# Patient Record
Sex: MALE | Race: WHITE | Employment: FULL TIME | URBAN - METROPOLITAN AREA
[De-identification: names, ages, dates, MRNs, and addresses within clinical notes are randomized per-mention and may not be internally consistent; named-entity substitution may affect disease eponyms.]

---

## 2017-02-01 ENCOUNTER — LAB ENCOUNTER (OUTPATIENT)
Dept: LAB | Age: 32
End: 2017-02-01
Payer: COMMERCIAL

## 2017-02-01 DIAGNOSIS — E03.9 HYPOTHYROIDISM: Primary | ICD-10-CM

## 2017-02-01 LAB — TSI SER-ACNC: 1.77 MIU/ML (ref 0.35–5.5)

## 2017-02-01 PROCEDURE — 36415 COLL VENOUS BLD VENIPUNCTURE: CPT

## 2017-02-01 PROCEDURE — 84443 ASSAY THYROID STIM HORMONE: CPT

## 2018-03-05 ENCOUNTER — TELEPHONE (OUTPATIENT)
Dept: INTERNAL MEDICINE CLINIC | Facility: CLINIC | Age: 33
End: 2018-03-05

## 2018-03-05 ENCOUNTER — OFFICE VISIT (OUTPATIENT)
Dept: INTERNAL MEDICINE CLINIC | Facility: CLINIC | Age: 33
End: 2018-03-05

## 2018-03-05 VITALS
RESPIRATION RATE: 14 BRPM | DIASTOLIC BLOOD PRESSURE: 80 MMHG | BODY MASS INDEX: 26.53 KG/M2 | WEIGHT: 169 LBS | HEART RATE: 84 BPM | SYSTOLIC BLOOD PRESSURE: 122 MMHG | TEMPERATURE: 98 F | HEIGHT: 67 IN

## 2018-03-05 DIAGNOSIS — Z77.011 LEAD EXPOSURE: ICD-10-CM

## 2018-03-05 DIAGNOSIS — E03.9 HYPOTHYROIDISM, UNSPECIFIED TYPE: Primary | ICD-10-CM

## 2018-03-05 DIAGNOSIS — Z00.00 LABORATORY EXAM ORDERED AS PART OF ROUTINE GENERAL MEDICAL EXAMINATION: ICD-10-CM

## 2018-03-05 PROCEDURE — 99203 OFFICE O/P NEW LOW 30 MIN: CPT | Performed by: PHYSICIAN ASSISTANT

## 2018-03-05 RX ORDER — LEVOTHYROXINE SODIUM 0.12 MG/1
125 TABLET ORAL
COMMUNITY
End: 2018-03-05

## 2018-03-05 NOTE — PATIENT INSTRUCTIONS
Please do your lab work in 6-8 weeks (middle to end of April). Remember to fast for 10-12 hours but drink plenty of water.

## 2018-03-05 NOTE — PROGRESS NOTES
Ernie Lowe is a 28year old male. HPI:   Patient presents for to establish care. Dx with hypothyroidism in childhood (age 16-14) and has been on levothyroxine since that time. Dose stable the last few years. Ran out of medication 2-3 weeks ago.   Gene rate and effort, clear to auscultation bilaterally  CARDIO: RRR, normal S1 & S2, no murmur  GI: soft, non-tender, non-distended, no guarding or rebound tenderness, no hepatosplenomegaly  EXTREMITIES: no cyanosis, clubbing, or edema  NEURO: A&O x3, appropri

## 2018-03-06 RX ORDER — LEVOTHYROXINE SODIUM 0.12 MG/1
125 TABLET ORAL
Qty: 90 TABLET | Refills: 0
Start: 2018-03-06

## 2018-06-04 ENCOUNTER — APPOINTMENT (OUTPATIENT)
Dept: LAB | Age: 33
End: 2018-06-04
Attending: PHYSICIAN ASSISTANT
Payer: COMMERCIAL

## 2018-06-04 DIAGNOSIS — Z00.00 LABORATORY EXAM ORDERED AS PART OF ROUTINE GENERAL MEDICAL EXAMINATION: ICD-10-CM

## 2018-06-04 DIAGNOSIS — E03.9 HYPOTHYROIDISM, UNSPECIFIED TYPE: ICD-10-CM

## 2018-06-04 DIAGNOSIS — Z77.011 LEAD EXPOSURE: ICD-10-CM

## 2018-06-04 PROCEDURE — 84450 TRANSFERASE (AST) (SGOT): CPT | Performed by: PHYSICIAN ASSISTANT

## 2018-06-04 PROCEDURE — 84443 ASSAY THYROID STIM HORMONE: CPT | Performed by: PHYSICIAN ASSISTANT

## 2018-06-04 PROCEDURE — 80048 BASIC METABOLIC PNL TOTAL CA: CPT | Performed by: PHYSICIAN ASSISTANT

## 2018-06-04 PROCEDURE — 36415 COLL VENOUS BLD VENIPUNCTURE: CPT | Performed by: PHYSICIAN ASSISTANT

## 2018-06-04 PROCEDURE — 83036 HEMOGLOBIN GLYCOSYLATED A1C: CPT | Performed by: PHYSICIAN ASSISTANT

## 2018-06-04 PROCEDURE — 84460 ALANINE AMINO (ALT) (SGPT): CPT | Performed by: PHYSICIAN ASSISTANT

## 2018-06-04 PROCEDURE — 83655 ASSAY OF LEAD: CPT | Performed by: PHYSICIAN ASSISTANT

## 2018-06-04 PROCEDURE — 80061 LIPID PANEL: CPT | Performed by: PHYSICIAN ASSISTANT

## 2018-06-08 RX ORDER — LEVOTHYROXINE SODIUM 0.12 MG/1
125 TABLET ORAL
Qty: 90 TABLET | Refills: 1 | Status: SHIPPED
Start: 2018-06-08 | End: 2019-02-04

## 2018-06-08 NOTE — TELEPHONE ENCOUNTER
From: Mega Amaro  Sent: 6/8/2018 7:15 AM CDT  Subject: Medication Renewal Request    Mega Amaro would like a refill of the following medications:     Levothyroxine Sodium 125 MCG Oral Tab REBECCA Hercules]    Preferred pharmacy: [facility] [address]  [id number] [facility] [phone], [phone]

## 2018-09-14 RX ORDER — LEVOTHYROXINE SODIUM 0.12 MG/1
125 TABLET ORAL
Qty: 90 TABLET | Refills: 1 | Status: CANCELLED | OUTPATIENT
Start: 2018-09-14

## 2018-09-14 NOTE — TELEPHONE ENCOUNTER
Pt should have a refill on file. Sent [facility]. Refill requested: Levothyroxine 125 mcg     Passed protocol      Last refill: 6/8/18 #90 1R    Relevant Labs: TSH 2/1/17   Last OV / RTC advised: 3/5/18   RTC 6-12 months    Appt Scheduled:  No

## 2019-02-06 RX ORDER — LEVOTHYROXINE SODIUM 0.12 MG/1
TABLET ORAL
Qty: 90 TABLET | Refills: 0 | OUTPATIENT
Start: 2019-02-06

## 2019-02-06 RX ORDER — LEVOTHYROXINE SODIUM 0.12 MG/1
125 TABLET ORAL
Qty: 90 TABLET | Refills: 0 | Status: SHIPPED | OUTPATIENT
Start: 2019-02-06 | End: 2019-03-12

## 2019-02-06 NOTE — TELEPHONE ENCOUNTER
Last OV: 3/5/18 with REBECCA Rdz  Last refill date: 6/8/18     #/refills: #90, 1 refill  When pt was asked to return for OV: 6-12 months  Upcoming appt/reason: no upcoming appt  Last labs 6/4/18

## 2019-02-07 RX ORDER — LEVOTHYROXINE SODIUM 0.12 MG/1
TABLET ORAL
Qty: 90 TABLET | Refills: 1 | OUTPATIENT
Start: 2019-02-07

## 2019-02-07 RX ORDER — LEVOTHYROXINE SODIUM 0.12 MG/1
125 TABLET ORAL
Qty: 90 TABLET | Refills: 0 | OUTPATIENT
Start: 2019-02-07

## 2019-02-07 NOTE — TELEPHONE ENCOUNTER
Last OV: 3/5/18 with REBECCA Rosa  Last refill date: 2/6/19     #/refills: #90, 0 refills    DUPLICATE REQUEST

## 2019-03-11 ENCOUNTER — LAB ENCOUNTER (OUTPATIENT)
Dept: LAB | Age: 34
End: 2019-03-11
Attending: PHYSICIAN ASSISTANT
Payer: COMMERCIAL

## 2019-03-11 ENCOUNTER — OFFICE VISIT (OUTPATIENT)
Dept: INTERNAL MEDICINE CLINIC | Facility: CLINIC | Age: 34
End: 2019-03-11
Payer: COMMERCIAL

## 2019-03-11 VITALS
DIASTOLIC BLOOD PRESSURE: 74 MMHG | HEIGHT: 66.25 IN | BODY MASS INDEX: 28.53 KG/M2 | WEIGHT: 177.5 LBS | OXYGEN SATURATION: 98 % | SYSTOLIC BLOOD PRESSURE: 102 MMHG | HEART RATE: 66 BPM | RESPIRATION RATE: 12 BRPM | TEMPERATURE: 98 F

## 2019-03-11 DIAGNOSIS — Z00.00 ANNUAL PHYSICAL EXAM: Primary | ICD-10-CM

## 2019-03-11 DIAGNOSIS — H91.91 HEARING LOSS OF RIGHT EAR, UNSPECIFIED HEARING LOSS TYPE: ICD-10-CM

## 2019-03-11 DIAGNOSIS — Z00.00 ANNUAL PHYSICAL EXAM: ICD-10-CM

## 2019-03-11 DIAGNOSIS — E03.9 HYPOTHYROIDISM, UNSPECIFIED TYPE: ICD-10-CM

## 2019-03-11 LAB
ANION GAP SERPL CALC-SCNC: 8 MMOL/L (ref 0–18)
BUN BLD-MCNC: 24 MG/DL (ref 7–18)
BUN/CREAT SERPL: 23.3 (ref 10–20)
CALCIUM BLD-MCNC: 9.4 MG/DL (ref 8.5–10.1)
CHLORIDE SERPL-SCNC: 110 MMOL/L (ref 98–107)
CHOLEST SMN-MCNC: 181 MG/DL (ref ?–200)
CO2 SERPL-SCNC: 24 MMOL/L (ref 21–32)
CREAT BLD-MCNC: 1.03 MG/DL (ref 0.7–1.3)
GLUCOSE BLD-MCNC: 66 MG/DL (ref 70–99)
HDLC SERPL-MCNC: 57 MG/DL (ref 40–59)
LDLC SERPL CALC-MCNC: 114 MG/DL (ref ?–100)
NONHDLC SERPL-MCNC: 124 MG/DL (ref ?–130)
OSMOLALITY SERPL CALC.SUM OF ELEC: 296 MOSM/KG (ref 275–295)
POTASSIUM SERPL-SCNC: 3.8 MMOL/L (ref 3.5–5.1)
SODIUM SERPL-SCNC: 142 MMOL/L (ref 136–145)
T4 FREE SERPL-MCNC: 1.2 NG/DL (ref 0.8–1.7)
TRIGL SERPL-MCNC: 49 MG/DL (ref 30–149)
TSI SER-ACNC: 21 MIU/ML (ref 0.36–3.74)
VLDLC SERPL CALC-MCNC: 10 MG/DL (ref 0–30)

## 2019-03-11 PROCEDURE — 80048 BASIC METABOLIC PNL TOTAL CA: CPT | Performed by: PHYSICIAN ASSISTANT

## 2019-03-11 PROCEDURE — 90686 IIV4 VACC NO PRSV 0.5 ML IM: CPT | Performed by: PHYSICIAN ASSISTANT

## 2019-03-11 PROCEDURE — 90471 IMMUNIZATION ADMIN: CPT | Performed by: PHYSICIAN ASSISTANT

## 2019-03-11 PROCEDURE — 84443 ASSAY THYROID STIM HORMONE: CPT | Performed by: PHYSICIAN ASSISTANT

## 2019-03-11 PROCEDURE — 84439 ASSAY OF FREE THYROXINE: CPT | Performed by: PHYSICIAN ASSISTANT

## 2019-03-11 PROCEDURE — 99395 PREV VISIT EST AGE 18-39: CPT | Performed by: PHYSICIAN ASSISTANT

## 2019-03-11 PROCEDURE — 80061 LIPID PANEL: CPT | Performed by: PHYSICIAN ASSISTANT

## 2019-03-11 PROCEDURE — 36415 COLL VENOUS BLD VENIPUNCTURE: CPT | Performed by: PHYSICIAN ASSISTANT

## 2019-03-11 NOTE — PROGRESS NOTES
Praneeth Lockwood is a 35year old male who presents for a complete physical exam.   HPI:   Pt here for wellness visit.     Diet - well balanced, about 50/50 lean & red meat, drinks about 40 ounces of soda daily, 1 coffee  Exercise - 5x/week - charan & anitha medina Left arm, Patient Position: Sitting, Cuff Size: large)   Pulse 66   Temp 97.6 °F (36.4 °C) (Oral)   Resp 12   Ht 66.25\"   Wt 177 lb 8 oz   SpO2 98%   BMI 28.43 kg/m²   GENERAL: A&O, well developed, well nourished, in no apparent distress  SKIN: no rashes,

## 2019-03-12 DIAGNOSIS — E03.9 HYPOTHYROIDISM, UNSPECIFIED TYPE: Primary | ICD-10-CM

## 2019-03-12 RX ORDER — LEVOTHYROXINE SODIUM 0.15 MG/1
150 TABLET ORAL
Qty: 90 TABLET | Refills: 0 | Status: SHIPPED | OUTPATIENT
Start: 2019-03-12 | End: 2019-08-30

## 2019-10-03 ENCOUNTER — APPOINTMENT (OUTPATIENT)
Dept: LAB | Age: 34
End: 2019-10-03
Attending: PHYSICIAN ASSISTANT
Payer: COMMERCIAL

## 2019-10-03 DIAGNOSIS — E03.9 HYPOTHYROIDISM, UNSPECIFIED TYPE: ICD-10-CM

## 2019-10-03 PROCEDURE — 36415 COLL VENOUS BLD VENIPUNCTURE: CPT

## 2019-10-03 PROCEDURE — 84443 ASSAY THYROID STIM HORMONE: CPT

## 2019-10-04 RX ORDER — LEVOTHYROXINE SODIUM 0.15 MG/1
150 TABLET ORAL
Qty: 90 TABLET | Refills: 1 | Status: SHIPPED | OUTPATIENT
Start: 2019-10-04 | End: 2020-04-16

## 2019-10-04 NOTE — TELEPHONE ENCOUNTER
Levothyroxine Sodium 150 MCG Oral Tab    Last OV relevant to medication: 3-     Last refill date: 8- # 30 tabs with 0 refills     When pt was asked to return for OV:  1 year for wellness visit    Upcoming appt/reason: none     Labs: 3-

## 2020-01-10 RX ORDER — LEVOTHYROXINE SODIUM 0.15 MG/1
150 TABLET ORAL
Qty: 90 TABLET | Refills: 1 | OUTPATIENT
Start: 2020-01-10

## 2020-01-10 NOTE — TELEPHONE ENCOUNTER
Last OV: 3/11/19 with Uma Dominguez PA-C  Last refill date: 10/4/19     #/refills: #90, 1 refill  When pt was asked to return for OV: 1 year  Upcoming appt/reason: no upcoming appt  Last labs 10/3/19 (TSH)

## 2020-04-16 ENCOUNTER — VIRTUAL PHONE E/M (OUTPATIENT)
Dept: INTERNAL MEDICINE CLINIC | Facility: CLINIC | Age: 35
End: 2020-04-16
Payer: COMMERCIAL

## 2020-04-16 DIAGNOSIS — Z11.59 NEED FOR HEPATITIS C SCREENING TEST: ICD-10-CM

## 2020-04-16 DIAGNOSIS — E03.9 HYPOTHYROIDISM, UNSPECIFIED TYPE: ICD-10-CM

## 2020-04-16 DIAGNOSIS — Z00.00 LABORATORY EXAM ORDERED AS PART OF ROUTINE GENERAL MEDICAL EXAMINATION: Primary | ICD-10-CM

## 2020-04-16 PROCEDURE — 99213 OFFICE O/P EST LOW 20 MIN: CPT | Performed by: PHYSICIAN ASSISTANT

## 2020-04-16 RX ORDER — LEVOTHYROXINE SODIUM 0.15 MG/1
150 TABLET ORAL
Qty: 90 TABLET | Refills: 0 | Status: SHIPPED | OUTPATIENT
Start: 2020-04-16 | End: 2020-07-23

## 2020-04-16 NOTE — PROGRESS NOTES
Virtual Telephone Check-In    Sherry Petty verbally consents to a Virtual/Telephone Check-In visit on 04/16/20. Patient understands and accepts financial responsibility for any deductible, co-insurance and/or co-pays associated with this service.     Guy Jaramillo breath, CELAYA, wheezing  CARDIOVASCULAR: denies chest pain, SOB, CELAYA, palpitations  GI: denies abdominal pain, nausea, vomiting, diarrhea, constipation, melena, BRBPR, heartburn  : denies dysuria, hematuria, nocuturia  MUSCULOSKELETAL: no arthralgias  NEUR

## 2020-07-21 ENCOUNTER — HOSPITAL ENCOUNTER (OUTPATIENT)
Age: 35
Discharge: STILL ADMITTED OR ADMINISTRATIVE | End: 2020-07-21
Payer: COMMERCIAL

## 2020-07-22 ENCOUNTER — APPOINTMENT (OUTPATIENT)
Dept: LAB | Age: 35
End: 2020-07-22
Attending: PHYSICIAN ASSISTANT
Payer: COMMERCIAL

## 2020-07-22 DIAGNOSIS — Z11.59 NEED FOR HEPATITIS C SCREENING TEST: ICD-10-CM

## 2020-07-22 DIAGNOSIS — E03.9 HYPOTHYROIDISM, UNSPECIFIED TYPE: ICD-10-CM

## 2020-07-22 DIAGNOSIS — Z00.00 LABORATORY EXAM ORDERED AS PART OF ROUTINE GENERAL MEDICAL EXAMINATION: ICD-10-CM

## 2020-07-22 LAB
ALT SERPL-CCNC: 28 U/L (ref 16–61)
ANION GAP SERPL CALC-SCNC: 2 MMOL/L (ref 0–18)
AST SERPL-CCNC: 28 U/L (ref 15–37)
BUN BLD-MCNC: 21 MG/DL (ref 7–18)
BUN/CREAT SERPL: 19.3 (ref 10–20)
CALCIUM BLD-MCNC: 9.7 MG/DL (ref 8.5–10.1)
CHLORIDE SERPL-SCNC: 108 MMOL/L (ref 98–112)
CHOLEST SMN-MCNC: 176 MG/DL (ref ?–200)
CO2 SERPL-SCNC: 28 MMOL/L (ref 21–32)
CREAT BLD-MCNC: 1.09 MG/DL (ref 0.7–1.3)
EST. AVERAGE GLUCOSE BLD GHB EST-MCNC: 103 MG/DL (ref 68–126)
GLUCOSE BLD-MCNC: 78 MG/DL (ref 70–99)
HBA1C MFR BLD HPLC: 5.2 % (ref ?–5.7)
HCV AB SERPL QL IA: NONREACTIVE
HDLC SERPL-MCNC: 58 MG/DL (ref 40–59)
LDLC SERPL CALC-MCNC: 110 MG/DL (ref ?–100)
NONHDLC SERPL-MCNC: 118 MG/DL (ref ?–130)
OSMOLALITY SERPL CALC.SUM OF ELEC: 288 MOSM/KG (ref 275–295)
PATIENT FASTING Y/N/NP: YES
PATIENT FASTING Y/N/NP: YES
POTASSIUM SERPL-SCNC: 4 MMOL/L (ref 3.5–5.1)
SODIUM SERPL-SCNC: 138 MMOL/L (ref 136–145)
TRIGL SERPL-MCNC: 38 MG/DL (ref 30–149)
TSI SER-ACNC: 0.68 MIU/ML (ref 0.36–3.74)
VLDLC SERPL CALC-MCNC: 8 MG/DL (ref 0–30)

## 2020-07-22 PROCEDURE — 80061 LIPID PANEL: CPT

## 2020-07-22 PROCEDURE — 83036 HEMOGLOBIN GLYCOSYLATED A1C: CPT

## 2020-07-22 PROCEDURE — 84460 ALANINE AMINO (ALT) (SGPT): CPT

## 2020-07-22 PROCEDURE — 86803 HEPATITIS C AB TEST: CPT

## 2020-07-22 PROCEDURE — 36415 COLL VENOUS BLD VENIPUNCTURE: CPT

## 2020-07-22 PROCEDURE — 80048 BASIC METABOLIC PNL TOTAL CA: CPT

## 2020-07-22 PROCEDURE — 84450 TRANSFERASE (AST) (SGOT): CPT

## 2020-07-22 PROCEDURE — 84443 ASSAY THYROID STIM HORMONE: CPT

## 2020-07-24 RX ORDER — LEVOTHYROXINE SODIUM 0.15 MG/1
150 TABLET ORAL
Qty: 90 TABLET | Refills: 3 | Status: SHIPPED | OUTPATIENT
Start: 2020-07-24 | End: 2021-08-04

## 2020-07-24 NOTE — TELEPHONE ENCOUNTER
Appointment needed - Due for CPX - [employer] message sent. Requesting Levothyroxine Sodium 150 MCG Oral Tab  LOV: 4/16/20 Virtual visit  RTC: 6-12 months  Last Relevant Labs: 7/22/20  Filled: 4/16/20 #90 with 0 refills    No future appointments.

## 2021-08-04 ENCOUNTER — OFFICE VISIT (OUTPATIENT)
Dept: INTERNAL MEDICINE CLINIC | Facility: CLINIC | Age: 36
End: 2021-08-04
Payer: COMMERCIAL

## 2021-08-04 VITALS
WEIGHT: 186.19 LBS | DIASTOLIC BLOOD PRESSURE: 80 MMHG | RESPIRATION RATE: 16 BRPM | TEMPERATURE: 98 F | BODY MASS INDEX: 29.22 KG/M2 | HEIGHT: 66.75 IN | SYSTOLIC BLOOD PRESSURE: 115 MMHG | HEART RATE: 86 BPM | OXYGEN SATURATION: 98 %

## 2021-08-04 DIAGNOSIS — E03.9 HYPOTHYROIDISM, UNSPECIFIED TYPE: ICD-10-CM

## 2021-08-04 DIAGNOSIS — Z00.00 ANNUAL PHYSICAL EXAM: Primary | ICD-10-CM

## 2021-08-04 PROCEDURE — 3074F SYST BP LT 130 MM HG: CPT | Performed by: PHYSICIAN ASSISTANT

## 2021-08-04 PROCEDURE — 99395 PREV VISIT EST AGE 18-39: CPT | Performed by: PHYSICIAN ASSISTANT

## 2021-08-04 PROCEDURE — 3079F DIAST BP 80-89 MM HG: CPT | Performed by: PHYSICIAN ASSISTANT

## 2021-08-04 PROCEDURE — 3008F BODY MASS INDEX DOCD: CPT | Performed by: PHYSICIAN ASSISTANT

## 2021-08-04 RX ORDER — MULTIVIT-MIN/IRON FUM/FOLIC AC 7.5 MG-4
1 TABLET ORAL DAILY
COMMUNITY

## 2021-08-04 RX ORDER — LEVOTHYROXINE SODIUM 0.15 MG/1
150 TABLET ORAL
Qty: 90 TABLET | Refills: 3 | Status: SHIPPED | OUTPATIENT
Start: 2021-08-04

## 2021-08-04 NOTE — PROGRESS NOTES
Cecilia Medina is a 28year old male who presents for a complete physical exam.   HPI:   Pt here for wellness visit. Hypothyroidism - takes med appropriately. occ headaches - sometimes migrainous with assoc blurred vision.   Pain improves with tylenol or warm constipation, melena, BRBPR, heartburn  : denies dysuria, hematuria, nocuturia  MUSCULOSKELETAL: denies joint redness or swelling  NEURO: denies headaches, dizziness, LH  HEMATOLOGIC: denies easy bruising or bleeding  LYMPH: denies swollen lymph nodes  E degree relative with AAA which required repair or  of AAA rupture -- not indicated. The patient indicates understanding of these issues and agrees to the plan. The patient is asked to return here in 1 year for CPX.

## 2021-08-04 NOTE — PATIENT INSTRUCTIONS
Please use [employer] or call Dk Noriega at [phone] to set up the following tests:  - fasting blood work    I recommend the following vaccines:  - flu shot in October  - TdaP (tetanus, diphtheria, pertussis)  - COVID    Follow up

## 2021-09-01 ENCOUNTER — LAB ENCOUNTER (OUTPATIENT)
Dept: LAB | Age: 36
End: 2021-09-01
Attending: PHYSICIAN ASSISTANT
Payer: COMMERCIAL

## 2021-09-01 DIAGNOSIS — E03.9 HYPOTHYROIDISM, UNSPECIFIED TYPE: ICD-10-CM

## 2021-09-01 DIAGNOSIS — Z00.00 ANNUAL PHYSICAL EXAM: ICD-10-CM

## 2021-09-01 LAB
ANION GAP SERPL CALC-SCNC: 3 MMOL/L (ref 0–18)
BUN BLD-MCNC: 23 MG/DL (ref 7–18)
CALCIUM BLD-MCNC: 9.3 MG/DL (ref 8.5–10.1)
CHLORIDE SERPL-SCNC: 111 MMOL/L (ref 98–112)
CHOLEST SMN-MCNC: 151 MG/DL (ref ?–200)
CO2 SERPL-SCNC: 26 MMOL/L (ref 21–32)
CREAT BLD-MCNC: 0.93 MG/DL
GLUCOSE BLD-MCNC: 91 MG/DL (ref 70–99)
HDLC SERPL-MCNC: 50 MG/DL (ref 40–59)
LDLC SERPL CALC-MCNC: 91 MG/DL (ref ?–100)
NONHDLC SERPL-MCNC: 101 MG/DL (ref ?–130)
OSMOLALITY SERPL CALC.SUM OF ELEC: 293 MOSM/KG (ref 275–295)
PATIENT FASTING Y/N/NP: YES
PATIENT FASTING Y/N/NP: YES
POTASSIUM SERPL-SCNC: 4.4 MMOL/L (ref 3.5–5.1)
SODIUM SERPL-SCNC: 140 MMOL/L (ref 136–145)
TRIGL SERPL-MCNC: 48 MG/DL (ref 30–149)
TSI SER-ACNC: 3.26 MIU/ML (ref 0.36–3.74)
VLDLC SERPL CALC-MCNC: 8 MG/DL (ref 0–30)

## 2021-09-01 PROCEDURE — 80048 BASIC METABOLIC PNL TOTAL CA: CPT

## 2021-09-01 PROCEDURE — 80061 LIPID PANEL: CPT

## 2021-09-01 PROCEDURE — 84443 ASSAY THYROID STIM HORMONE: CPT

## 2021-09-01 PROCEDURE — 36415 COLL VENOUS BLD VENIPUNCTURE: CPT

## 2022-06-29 ENCOUNTER — APPOINTMENT (OUTPATIENT)
Dept: HEMATOLOGY/ONCOLOGY | Facility: HOSPITAL | Age: 37
End: 2022-06-29
Payer: COMMERCIAL

## 2022-07-06 ENCOUNTER — HOSPITAL ENCOUNTER (OUTPATIENT)
Age: 37
Discharge: HOME | End: 2022-07-06
Attending: EMERGENCY MEDICINE
Payer: COMMERCIAL

## 2022-07-06 VITALS
HEIGHT: 68 IN | BODY MASS INDEX: 27.28 KG/M2 | TEMPERATURE: 98 F | HEART RATE: 92 BPM | OXYGEN SATURATION: 99 % | WEIGHT: 180 LBS | RESPIRATION RATE: 18 BRPM | SYSTOLIC BLOOD PRESSURE: 141 MMHG | DIASTOLIC BLOOD PRESSURE: 76 MMHG

## 2022-07-06 DIAGNOSIS — L02.91 ABSCESS: Primary | ICD-10-CM

## 2022-07-06 PROCEDURE — 99213 OFFICE O/P EST LOW 20 MIN: CPT

## 2022-07-06 PROCEDURE — 10061 I&D ABSCESS COMP/MULTIPLE: CPT

## 2022-07-06 PROCEDURE — 99203 OFFICE O/P NEW LOW 30 MIN: CPT

## 2022-07-06 RX ORDER — CLINDAMYCIN HYDROCHLORIDE 300 MG/1
600 CAPSULE ORAL 3 TIMES DAILY
Qty: 60 CAPSULE | Refills: 0 | Status: SHIPPED | OUTPATIENT
Start: 2022-07-06 | End: 2022-07-16

## 2022-07-06 NOTE — ED INITIAL ASSESSMENT (HPI)
Patient presents to IC with c/o redness and fluid filled cyst to right upper back. States has had the cyst for years but tinkered with it last week and was able to express some drainage. Has festered since Sunday. No fever. Painful to touch.

## 2022-07-07 ENCOUNTER — OFFICE VISIT (OUTPATIENT)
Facility: LOCATION | Age: 37
End: 2022-07-07
Payer: COMMERCIAL

## 2022-07-07 VITALS
DIASTOLIC BLOOD PRESSURE: 98 MMHG | SYSTOLIC BLOOD PRESSURE: 155 MMHG | HEART RATE: 83 BPM | HEIGHT: 68 IN | BODY MASS INDEX: 27.28 KG/M2 | TEMPERATURE: 98 F | WEIGHT: 180 LBS

## 2022-07-07 DIAGNOSIS — L08.9 INFECTED SEBACEOUS CYST OF SKIN: Primary | ICD-10-CM

## 2022-07-07 DIAGNOSIS — L72.3 INFECTED SEBACEOUS CYST OF SKIN: Primary | ICD-10-CM

## 2022-07-07 PROCEDURE — 99203 OFFICE O/P NEW LOW 30 MIN: CPT | Performed by: SURGERY

## 2022-07-07 PROCEDURE — 3077F SYST BP >= 140 MM HG: CPT | Performed by: SURGERY

## 2022-07-07 PROCEDURE — 3080F DIAST BP >= 90 MM HG: CPT | Performed by: SURGERY

## 2022-07-07 PROCEDURE — 3008F BODY MASS INDEX DOCD: CPT | Performed by: SURGERY

## 2022-07-09 NOTE — PROCEDURES
Procedure note    Date of procedure-July 7, 2022    Preoperative diagnosis-large infected sebaceous cyst of the central back    Indication for procedure-painful infected sebaceous cyst    Postoperative diagnosis-same    Procedure-   incision, drainage, debridement infected cyst of the back    Anesthesia- local    SurgeonRasheeda Thompson    Discussed with Patient-the potential risks and benefits of the procedure were reviewed in detail with the patient including but not limited to bleeding, infection, seroma hematoma formation, possibility of recurrence, possible need for further intervention pending clinical course. These another potential intraoperative and perioperative risks were reviewed. The patient and any family members that are present have no further questions at this time and do wish to proceed as discussed. Summary of procedure-the patient was placed on the examination table in a lateral decubitus position. The area in the central back was prepped with Betadine. 1% lidocaine was infiltrated into the area of the cyst for anesthesia. 11 blade was used to make a vertical incision in the central of the cyst in the area of maximal fluctuance. A large amount of purulent fluid was retrieved. The cyst was probed for loculations. The cyst was then packed open with iodoform gauze. The patient did tolerate the procedure well. Discharge instructions were given to the patient as well as any family members present. They do not have any further questions at this time. Follow-up directions were also given. The patient was discharged from the office in good condition.     Handy Caldwell M.D.

## 2022-07-14 ENCOUNTER — OFFICE VISIT (OUTPATIENT)
Facility: LOCATION | Age: 37
End: 2022-07-14
Payer: COMMERCIAL

## 2022-07-14 VITALS
SYSTOLIC BLOOD PRESSURE: 114 MMHG | WEIGHT: 180 LBS | HEART RATE: 82 BPM | HEIGHT: 68 IN | BODY MASS INDEX: 27.28 KG/M2 | TEMPERATURE: 97 F | DIASTOLIC BLOOD PRESSURE: 79 MMHG

## 2022-07-14 DIAGNOSIS — L08.9 INFECTED SEBACEOUS CYST OF SKIN: Primary | ICD-10-CM

## 2022-07-14 DIAGNOSIS — L72.3 INFECTED SEBACEOUS CYST OF SKIN: Primary | ICD-10-CM

## 2022-07-14 PROCEDURE — 3074F SYST BP LT 130 MM HG: CPT | Performed by: SURGERY

## 2022-07-14 PROCEDURE — 99213 OFFICE O/P EST LOW 20 MIN: CPT | Performed by: SURGERY

## 2022-07-14 PROCEDURE — 3008F BODY MASS INDEX DOCD: CPT | Performed by: SURGERY

## 2022-07-14 PROCEDURE — 3078F DIAST BP <80 MM HG: CPT | Performed by: SURGERY

## 2022-08-30 ENCOUNTER — LAB ENCOUNTER (OUTPATIENT)
Dept: LAB | Age: 37
End: 2022-08-30
Attending: PHYSICIAN ASSISTANT
Payer: COMMERCIAL

## 2022-08-30 ENCOUNTER — OFFICE VISIT (OUTPATIENT)
Dept: INTERNAL MEDICINE CLINIC | Facility: CLINIC | Age: 37
End: 2022-08-30
Payer: COMMERCIAL

## 2022-08-30 VITALS
SYSTOLIC BLOOD PRESSURE: 110 MMHG | BODY MASS INDEX: 30.87 KG/M2 | TEMPERATURE: 98 F | HEIGHT: 66.34 IN | HEART RATE: 83 BPM | DIASTOLIC BLOOD PRESSURE: 84 MMHG | OXYGEN SATURATION: 98 % | WEIGHT: 194.38 LBS

## 2022-08-30 DIAGNOSIS — E03.9 HYPOTHYROIDISM, UNSPECIFIED TYPE: ICD-10-CM

## 2022-08-30 DIAGNOSIS — E66.9 OBESITY (BMI 30-39.9): ICD-10-CM

## 2022-08-30 DIAGNOSIS — Z00.00 ANNUAL PHYSICAL EXAM: Primary | ICD-10-CM

## 2022-08-30 DIAGNOSIS — Z00.00 ANNUAL PHYSICAL EXAM: ICD-10-CM

## 2022-08-30 LAB
ANION GAP SERPL CALC-SCNC: 3 MMOL/L (ref 0–18)
BUN BLD-MCNC: 18 MG/DL (ref 7–18)
CALCIUM BLD-MCNC: 9.6 MG/DL (ref 8.5–10.1)
CHLORIDE SERPL-SCNC: 110 MMOL/L (ref 98–112)
CHOLEST SERPL-MCNC: 150 MG/DL (ref ?–200)
CO2 SERPL-SCNC: 27 MMOL/L (ref 21–32)
CREAT BLD-MCNC: 1.06 MG/DL
FASTING PATIENT LIPID ANSWER: YES
FASTING STATUS PATIENT QL REPORTED: YES
GFR SERPLBLD BASED ON 1.73 SQ M-ARVRAT: 93 ML/MIN/1.73M2 (ref 60–?)
GLUCOSE BLD-MCNC: 92 MG/DL (ref 70–99)
HDLC SERPL-MCNC: 51 MG/DL (ref 40–59)
LDLC SERPL CALC-MCNC: 88 MG/DL (ref ?–100)
NONHDLC SERPL-MCNC: 99 MG/DL (ref ?–130)
OSMOLALITY SERPL CALC.SUM OF ELEC: 292 MOSM/KG (ref 275–295)
POTASSIUM SERPL-SCNC: 4.1 MMOL/L (ref 3.5–5.1)
SODIUM SERPL-SCNC: 140 MMOL/L (ref 136–145)
TRIGL SERPL-MCNC: 54 MG/DL (ref 30–149)
TSI SER-ACNC: 1.65 MIU/ML (ref 0.36–3.74)
VLDLC SERPL CALC-MCNC: 9 MG/DL (ref 0–30)

## 2022-08-30 PROCEDURE — 99395 PREV VISIT EST AGE 18-39: CPT | Performed by: PHYSICIAN ASSISTANT

## 2022-08-30 PROCEDURE — 90715 TDAP VACCINE 7 YRS/> IM: CPT | Performed by: PHYSICIAN ASSISTANT

## 2022-08-30 PROCEDURE — 3074F SYST BP LT 130 MM HG: CPT | Performed by: PHYSICIAN ASSISTANT

## 2022-08-30 PROCEDURE — 84443 ASSAY THYROID STIM HORMONE: CPT

## 2022-08-30 PROCEDURE — 3008F BODY MASS INDEX DOCD: CPT | Performed by: PHYSICIAN ASSISTANT

## 2022-08-30 PROCEDURE — 3079F DIAST BP 80-89 MM HG: CPT | Performed by: PHYSICIAN ASSISTANT

## 2022-08-30 PROCEDURE — 80061 LIPID PANEL: CPT

## 2022-08-30 PROCEDURE — 90471 IMMUNIZATION ADMIN: CPT | Performed by: PHYSICIAN ASSISTANT

## 2022-08-30 PROCEDURE — 36415 COLL VENOUS BLD VENIPUNCTURE: CPT

## 2022-08-30 PROCEDURE — 80048 BASIC METABOLIC PNL TOTAL CA: CPT

## 2022-08-30 NOTE — PATIENT INSTRUCTIONS
Blood work today. Flu shot in October. Please focus on a diet consisting of lean or plant based proteins, fruits, veggies, and whole grains, as well as regular exercise (30 minutes daily). See Maryam Colbert in 1 year.

## 2022-08-31 RX ORDER — LEVOTHYROXINE SODIUM 0.15 MG/1
150 TABLET ORAL
Qty: 90 TABLET | Refills: 3 | Status: SHIPPED | OUTPATIENT
Start: 2022-08-31

## 2023-01-27 ENCOUNTER — TELEPHONE (OUTPATIENT)
Dept: INTERNAL MEDICINE CLINIC | Facility: CLINIC | Age: 38
End: 2023-01-27

## 2023-01-27 DIAGNOSIS — H91.91 HEARING LOSS OF RIGHT EAR, UNSPECIFIED HEARING LOSS TYPE: Primary | ICD-10-CM

## 2023-01-27 NOTE — TELEPHONE ENCOUNTER
Pt called stating he needs a referral for a hearing test for new hearing aids.     [facility]   ph: [phone]  fx: [phone]

## 2023-01-27 NOTE — TELEPHONE ENCOUNTER
Referral auth and and faxed to   Fax # [phone]     Received fax confirmation    Tried leaving message with pt to inform him that referral has been faxed over, but mailbox is full.

## 2023-07-12 ENCOUNTER — PATIENT OUTREACH (OUTPATIENT)
Dept: INTERNAL MEDICINE CLINIC | Facility: CLINIC | Age: 38
End: 2023-07-12

## 2023-07-12 NOTE — PROGRESS NOTES
Pt returned call and relayed [facility] message. Pt will look over our doctors in our office to see who he will like to establish with.

## 2023-07-12 NOTE — PROGRESS NOTES
Attempted to reach pt, unable to leave a vm. Pt is to re establish with a PCP in the office, due to [facility] leaving the practice. Changed PCP to [facility], when pt calls to schedule an appt, they can schedule with a provider of there choosing that is accepting new patients, change PCP to appropriate provider once appt is scheduled.

## 2023-09-27 NOTE — TELEPHONE ENCOUNTER
LOV: 8/30/2022 with Glenn Bain PA-C  RTC: 1 year  Last Relevant Labs: 8/30/2022  Filled: 8/31/2022    #90 with 3 refills    No future appointments.

## 2023-09-28 RX ORDER — LEVOTHYROXINE SODIUM 0.15 MG/1
150 TABLET ORAL
Qty: 90 TABLET | Refills: 0 | Status: SHIPPED | OUTPATIENT
Start: 2023-09-28

## 2023-09-28 NOTE — TELEPHONE ENCOUNTER
Called and spoke w pt    Pt v/u    Informed pt due for appt    Pt is scheduled     Future Appointments   Date Time Provider Lg Arnold   10/11/2023  3:00 PM Viraj Akbar DO [facility] [place]

## 2023-10-11 ENCOUNTER — OFFICE VISIT (OUTPATIENT)
Dept: INTERNAL MEDICINE CLINIC | Facility: CLINIC | Age: 38
End: 2023-10-11
Payer: COMMERCIAL

## 2023-10-11 ENCOUNTER — LAB ENCOUNTER (OUTPATIENT)
Dept: LAB | Age: 38
End: 2023-10-11
Attending: INTERNAL MEDICINE
Payer: COMMERCIAL

## 2023-10-11 VITALS
HEART RATE: 83 BPM | BODY MASS INDEX: 30.43 KG/M2 | SYSTOLIC BLOOD PRESSURE: 122 MMHG | HEIGHT: 66.34 IN | OXYGEN SATURATION: 98 % | WEIGHT: 191.63 LBS | TEMPERATURE: 98 F | DIASTOLIC BLOOD PRESSURE: 68 MMHG | RESPIRATION RATE: 20 BRPM

## 2023-10-11 DIAGNOSIS — E03.9 HYPOTHYROIDISM, UNSPECIFIED TYPE: ICD-10-CM

## 2023-10-11 DIAGNOSIS — Z00.00 ROUTINE PHYSICAL EXAMINATION: Primary | ICD-10-CM

## 2023-10-11 DIAGNOSIS — Z00.00 ROUTINE PHYSICAL EXAMINATION: ICD-10-CM

## 2023-10-11 PROBLEM — L08.9 INFECTED SEBACEOUS CYST OF SKIN: Status: RESOLVED | Noted: 2022-07-07 | Resolved: 2023-10-11

## 2023-10-11 PROBLEM — Z97.4 USES HEARING AID: Status: ACTIVE | Noted: 2023-10-11

## 2023-10-11 PROBLEM — L72.3 INFECTED SEBACEOUS CYST OF SKIN: Status: RESOLVED | Noted: 2022-07-07 | Resolved: 2023-10-11

## 2023-10-11 PROBLEM — H91.91 HEARING LOSS OF RIGHT EAR: Status: RESOLVED | Noted: 2019-03-11 | Resolved: 2023-10-11

## 2023-10-11 LAB
ALT SERPL-CCNC: 64 U/L
ANION GAP SERPL CALC-SCNC: 6 MMOL/L (ref 0–18)
AST SERPL-CCNC: 39 U/L (ref 15–37)
BASOPHILS # BLD AUTO: 0.08 X10(3) UL (ref 0–0.2)
BASOPHILS NFR BLD AUTO: 1 %
BUN BLD-MCNC: 23 MG/DL (ref 7–18)
CALCIUM BLD-MCNC: 9.5 MG/DL (ref 8.5–10.1)
CHLORIDE SERPL-SCNC: 107 MMOL/L (ref 98–112)
CHOLEST SERPL-MCNC: 207 MG/DL (ref ?–200)
CO2 SERPL-SCNC: 25 MMOL/L (ref 21–32)
CREAT BLD-MCNC: 1.02 MG/DL
EGFRCR SERPLBLD CKD-EPI 2021: 96 ML/MIN/1.73M2 (ref 60–?)
EOSINOPHIL # BLD AUTO: 0.33 X10(3) UL (ref 0–0.7)
EOSINOPHIL NFR BLD AUTO: 4 %
ERYTHROCYTE [DISTWIDTH] IN BLOOD BY AUTOMATED COUNT: 12.1 %
FASTING PATIENT LIPID ANSWER: YES
FASTING STATUS PATIENT QL REPORTED: YES
GLUCOSE BLD-MCNC: 92 MG/DL (ref 70–99)
HCT VFR BLD AUTO: 43.8 %
HDLC SERPL-MCNC: 52 MG/DL (ref 40–59)
HGB BLD-MCNC: 15.3 G/DL
IMM GRANULOCYTES # BLD AUTO: 0.02 X10(3) UL (ref 0–1)
IMM GRANULOCYTES NFR BLD: 0.2 %
LDLC SERPL CALC-MCNC: 145 MG/DL (ref ?–100)
LYMPHOCYTES # BLD AUTO: 1.96 X10(3) UL (ref 1–4)
LYMPHOCYTES NFR BLD AUTO: 23.8 %
MCH RBC QN AUTO: 29.6 PG (ref 26–34)
MCHC RBC AUTO-ENTMCNC: 34.9 G/DL (ref 31–37)
MCV RBC AUTO: 84.7 FL
MONOCYTES # BLD AUTO: 0.56 X10(3) UL (ref 0.1–1)
MONOCYTES NFR BLD AUTO: 6.8 %
NEUTROPHILS # BLD AUTO: 5.3 X10 (3) UL (ref 1.5–7.7)
NEUTROPHILS # BLD AUTO: 5.3 X10(3) UL (ref 1.5–7.7)
NEUTROPHILS NFR BLD AUTO: 64.2 %
NONHDLC SERPL-MCNC: 155 MG/DL (ref ?–130)
OSMOLALITY SERPL CALC.SUM OF ELEC: 289 MOSM/KG (ref 275–295)
PLATELET # BLD AUTO: 400 10(3)UL (ref 150–450)
POTASSIUM SERPL-SCNC: 3.9 MMOL/L (ref 3.5–5.1)
RBC # BLD AUTO: 5.17 X10(6)UL
SODIUM SERPL-SCNC: 138 MMOL/L (ref 136–145)
TRIGL SERPL-MCNC: 55 MG/DL (ref 30–149)
TSI SER-ACNC: 1.31 MIU/ML (ref 0.36–3.74)
VLDLC SERPL CALC-MCNC: 10 MG/DL (ref 0–30)
WBC # BLD AUTO: 8.3 X10(3) UL (ref 4–11)

## 2023-10-11 PROCEDURE — 99395 PREV VISIT EST AGE 18-39: CPT | Performed by: INTERNAL MEDICINE

## 2023-10-11 PROCEDURE — 84450 TRANSFERASE (AST) (SGOT): CPT

## 2023-10-11 PROCEDURE — 3074F SYST BP LT 130 MM HG: CPT | Performed by: INTERNAL MEDICINE

## 2023-10-11 PROCEDURE — 85025 COMPLETE CBC W/AUTO DIFF WBC: CPT

## 2023-10-11 PROCEDURE — 36415 COLL VENOUS BLD VENIPUNCTURE: CPT

## 2023-10-11 PROCEDURE — 3008F BODY MASS INDEX DOCD: CPT | Performed by: INTERNAL MEDICINE

## 2023-10-11 PROCEDURE — 84460 ALANINE AMINO (ALT) (SGPT): CPT

## 2023-10-11 PROCEDURE — 3078F DIAST BP <80 MM HG: CPT | Performed by: INTERNAL MEDICINE

## 2023-10-11 PROCEDURE — 84443 ASSAY THYROID STIM HORMONE: CPT

## 2023-10-11 PROCEDURE — 80061 LIPID PANEL: CPT

## 2023-10-11 PROCEDURE — 80048 BASIC METABOLIC PNL TOTAL CA: CPT

## 2023-10-11 RX ORDER — LEVOTHYROXINE SODIUM 0.15 MG/1
150 TABLET ORAL
Qty: 90 TABLET | Refills: 3 | Status: SHIPPED | OUTPATIENT
Start: 2023-10-11

## 2023-10-11 NOTE — PATIENT INSTRUCTIONS
Continue to exercise at least 150 minutes a week and Eat a plant based diet     Please take 2000 IU of vitamin D daily to keep your bones strong    Please get blood tests and the flu vaccine through the pharmacy    See us back yearly

## 2023-10-12 DIAGNOSIS — R79.89 ELEVATED LFTS: Primary | ICD-10-CM

## 2024-01-16 ENCOUNTER — OFFICE VISIT (OUTPATIENT)
Dept: INTERNAL MEDICINE CLINIC | Facility: CLINIC | Age: 39
End: 2024-01-16
Payer: COMMERCIAL

## 2024-01-16 VITALS
HEART RATE: 68 BPM | RESPIRATION RATE: 18 BRPM | TEMPERATURE: 98 F | DIASTOLIC BLOOD PRESSURE: 68 MMHG | WEIGHT: 189.63 LBS | OXYGEN SATURATION: 99 % | HEIGHT: 66.34 IN | SYSTOLIC BLOOD PRESSURE: 118 MMHG | BODY MASS INDEX: 30.11 KG/M2

## 2024-01-16 DIAGNOSIS — M54.50 LUMBAR PAIN: Primary | ICD-10-CM

## 2024-01-16 DIAGNOSIS — K42.9 UMBILICAL HERNIA WITHOUT OBSTRUCTION AND WITHOUT GANGRENE: ICD-10-CM

## 2024-01-16 PROCEDURE — 3078F DIAST BP <80 MM HG: CPT | Performed by: INTERNAL MEDICINE

## 2024-01-16 PROCEDURE — 3074F SYST BP LT 130 MM HG: CPT | Performed by: INTERNAL MEDICINE

## 2024-01-16 PROCEDURE — 99213 OFFICE O/P EST LOW 20 MIN: CPT | Performed by: INTERNAL MEDICINE

## 2024-01-16 PROCEDURE — 3008F BODY MASS INDEX DOCD: CPT | Performed by: INTERNAL MEDICINE

## 2024-01-16 NOTE — PATIENT INSTRUCTIONS
You can use heat for 20 minutes up to 3 times a day.  Continue mild stretches.  I have referred you to the surgeon if you would like to follow-up in regards to the hernia.    If your back pain worsens in any way please let me know

## 2024-01-16 NOTE — PROGRESS NOTES
Patient Office Visit    ASSESSMENT AND PLAN:   1. Lumbar pain  Note: Discussed with patient that likely this is a muscular strain and recommended to continue stretches.  Follow-up if symptoms worsen in any way    2. Umbilical hernia without obstruction and without gangrene  - Surgery Referral - In Network      Return to clinic as needed for above    Patient/Caregiver Education: Patient/Caregiver Education: There are no barriers to learning. Medical education done. Outcome: Patient verbalizes understanding. Patient is notified to call with any questions, complications, allergies, or worsening or changing symptoms.  Patient is to call with any side effects or complications from the treatments as a result of today.      Reviewed Past Medical History and   Patient Active Problem List   Diagnosis    Hypothyroidism    Uses hearing aid       No orders of the defined types were placed in this encounter.    Requested Prescriptions      No prescriptions requested or ordered in this encounter         Humera Bennett DO  CC:  Chief Complaint   Patient presents with    Back Pain         HPI:   Jamal Fritz is a 38-year-old male who presents for low back pain    Low back pain: Has been going on for the past few weeks.  It is located on the left side and sometimes goes to the abdominal region.  He does have an umbilical hernia and was wondering if that is the cause of his symptoms.  He has been doing some stretches which has helped and his pain has reduced.  Having regular bowel movements with no saddle anesthesia and no radicular symptoms down the leg    Past Medical History:   Diagnosis Date    Hypothyroidism        Past Surgical History:   Procedure Laterality Date    OTHER SURGICAL HISTORY      wisdom tooth extraction    TONSILLECTOMY         Social History:  Social History     Socioeconomic History    Marital status:    Tobacco Use    Smoking status: Never    Smokeless tobacco: Never   Vaping Use    Vaping Use: Never  used   Substance and Sexual Activity    Alcohol use: Yes     Alcohol/week: 0.0 - 1.0 standard drinks of alcohol     Comment: occasionally    Drug use: No   Other Topics Concern    Caffeine Concern Yes    Exercise Yes     Family History:  Family History   Problem Relation Age of Onset    Cancer Father 50        prostate CA    Other (Other) Mother         hypothyroidism    Cancer Maternal Grandmother         breast CA    Cancer Paternal Grandfather 50        prostate CA    Other (Other) Sister         hypothyroidism, thyroid CA    No Known Problems Maternal Grandfather     No Known Problems Paternal Grandmother     Diabetes Neg     Heart Disorder Neg     Stroke Neg      Allergies:  No Known Allergies  Current Meds:  Current Outpatient Medications on File Prior to Visit   Medication Sig Dispense Refill    levothyroxine 150 MCG Oral Tab Take 1 tablet (150 mcg total) by mouth before breakfast. 90 tablet 3    Multiple Vitamins-Minerals (MULTI-VITAMIN/MINERALS) Oral Tab Take 1 tablet by mouth daily.       No current facility-administered medications on file prior to visit.         REVIEW OF SYSTEMS   Constitutional: no fatigue normal energy no weight changes   HENT: normal sinuses and no mouth issues   Eyes: . normal vision no eye pain   Respiratory: normal respirations no cough   Cardiovascular: no CP, or palpitations   Gastrointestinal: normal bowels and no abd pains   Genitourinary:  normal urination no hematuria, no frequency   Musculoskeletal: no pains in arms/legs, normal range of motion   Skin: no rashes or skin lesions that are new   Neurological:  no weakness, no numbness, normal gait   Hematological:  no bruises or bleeding   Psychiatric/Behavioral: normal mood no anxiety normal behavior     /68 (BP Location: Right arm, Patient Position: Sitting, Cuff Size: adult)   Pulse 68   Temp 98 °F (36.7 °C) (Temporal)   Resp 18   Ht 5' 6.34\" (1.685 m)   Wt 189 lb 9.6 oz (86 kg)   SpO2 99%   BMI 30.29 kg/m²      PHYSICAL EXAM:   Constitutional: Vital signs reviewed as noted, well developed, in no acute distress.   HENT: NCAT  Eyes: pupils reactive bilaterally  Cardiovascular: nl s1 s2 no m/r/g  Pulmonary/Chest: CTA bilaterally with no wheezes  Abdominal: Soft NT normal Bowel sounds, small umbilical hernia noted   Musculoskeletal: No midline or paraspinal tenderness.  Negative straight leg test  Extremities: no pedal edema   Neurological:  no weakness in UE and LE, reflexes are normal  Skin: no rashes or bruises on visualized skin, not undressed   Psychiatric:normal mood

## 2024-07-18 RX ORDER — LEVOTHYROXINE SODIUM 0.15 MG/1
150 TABLET ORAL
Qty: 90 TABLET | Refills: 0 | Status: SHIPPED | OUTPATIENT
Start: 2024-07-18

## 2024-07-18 NOTE — TELEPHONE ENCOUNTER
PASS  Lov: 10/11/23       F/u 1 year    No future appointments.  Hypothyroid: stable on medication. Has family history of thyroid cancer       no

## 2024-10-23 NOTE — TELEPHONE ENCOUNTER
Protocol FAIL    Requesting: LEVOTHYROXINE 150 MCG Oral Tab     LOV: 1/16/24  RTC:   Filled: 7/18/24 90 TABLET 0 REFILL  Recent Labs: 10/11/23    Upcoming OV   No future appointments.

## 2024-10-24 RX ORDER — LEVOTHYROXINE SODIUM 150 UG/1
150 TABLET ORAL
Qty: 90 TABLET | Refills: 0 | Status: SHIPPED | OUTPATIENT
Start: 2024-10-24

## 2025-01-21 ENCOUNTER — OFFICE VISIT (OUTPATIENT)
Dept: INTERNAL MEDICINE CLINIC | Facility: CLINIC | Age: 40
End: 2025-01-21
Payer: COMMERCIAL

## 2025-01-21 ENCOUNTER — LAB ENCOUNTER (OUTPATIENT)
Dept: LAB | Age: 40
End: 2025-01-21
Attending: INTERNAL MEDICINE
Payer: COMMERCIAL

## 2025-01-21 VITALS
SYSTOLIC BLOOD PRESSURE: 122 MMHG | OXYGEN SATURATION: 98 % | WEIGHT: 176.81 LBS | HEART RATE: 74 BPM | DIASTOLIC BLOOD PRESSURE: 74 MMHG | BODY MASS INDEX: 28.08 KG/M2 | RESPIRATION RATE: 18 BRPM | HEIGHT: 66.34 IN | TEMPERATURE: 98 F

## 2025-01-21 DIAGNOSIS — E03.9 HYPOTHYROIDISM, UNSPECIFIED TYPE: ICD-10-CM

## 2025-01-21 DIAGNOSIS — K42.9 UMBILICAL HERNIA WITHOUT OBSTRUCTION AND WITHOUT GANGRENE: ICD-10-CM

## 2025-01-21 DIAGNOSIS — F41.9 ANXIETY: ICD-10-CM

## 2025-01-21 DIAGNOSIS — Z00.00 ROUTINE PHYSICAL EXAMINATION: Primary | ICD-10-CM

## 2025-01-21 DIAGNOSIS — Z80.42 FAMILY HISTORY OF PROSTATE CANCER: ICD-10-CM

## 2025-01-21 DIAGNOSIS — E66.3 OVERWEIGHT (BMI 25.0-29.9): ICD-10-CM

## 2025-01-21 DIAGNOSIS — Z00.00 ROUTINE PHYSICAL EXAMINATION: ICD-10-CM

## 2025-01-21 LAB
ALT SERPL-CCNC: 27 U/L
ANION GAP SERPL CALC-SCNC: 8 MMOL/L (ref 0–18)
AST SERPL-CCNC: 34 U/L (ref ?–34)
BASOPHILS # BLD AUTO: 0.06 X10(3) UL (ref 0–0.2)
BASOPHILS NFR BLD AUTO: 0.9 %
BUN BLD-MCNC: 15 MG/DL (ref 9–23)
CALCIUM BLD-MCNC: 10.5 MG/DL (ref 8.7–10.6)
CHLORIDE SERPL-SCNC: 105 MMOL/L (ref 98–112)
CHOLEST SERPL-MCNC: 200 MG/DL (ref ?–200)
CO2 SERPL-SCNC: 28 MMOL/L (ref 21–32)
COMPLEXED PSA SERPL-MCNC: 1.21 NG/ML (ref ?–4)
CREAT BLD-MCNC: 1.12 MG/DL
EGFRCR SERPLBLD CKD-EPI 2021: 86 ML/MIN/1.73M2 (ref 60–?)
EOSINOPHIL # BLD AUTO: 0.11 X10(3) UL (ref 0–0.7)
EOSINOPHIL NFR BLD AUTO: 1.7 %
ERYTHROCYTE [DISTWIDTH] IN BLOOD BY AUTOMATED COUNT: 12 %
EST. AVERAGE GLUCOSE BLD GHB EST-MCNC: 103 MG/DL (ref 68–126)
FASTING PATIENT LIPID ANSWER: YES
FASTING STATUS PATIENT QL REPORTED: YES
GLUCOSE BLD-MCNC: 86 MG/DL (ref 70–99)
HBA1C MFR BLD: 5.2 % (ref ?–5.7)
HCT VFR BLD AUTO: 47 %
HDLC SERPL-MCNC: 60 MG/DL (ref 40–59)
HGB BLD-MCNC: 16.4 G/DL
IMM GRANULOCYTES # BLD AUTO: 0.02 X10(3) UL (ref 0–1)
IMM GRANULOCYTES NFR BLD: 0.3 %
LDLC SERPL CALC-MCNC: 131 MG/DL (ref ?–100)
LYMPHOCYTES # BLD AUTO: 1.55 X10(3) UL (ref 1–4)
LYMPHOCYTES NFR BLD AUTO: 24.5 %
MCH RBC QN AUTO: 30.8 PG (ref 26–34)
MCHC RBC AUTO-ENTMCNC: 34.9 G/DL (ref 31–37)
MCV RBC AUTO: 88.3 FL
MONOCYTES # BLD AUTO: 0.39 X10(3) UL (ref 0.1–1)
MONOCYTES NFR BLD AUTO: 6.2 %
NEUTROPHILS # BLD AUTO: 4.2 X10 (3) UL (ref 1.5–7.7)
NEUTROPHILS # BLD AUTO: 4.2 X10(3) UL (ref 1.5–7.7)
NEUTROPHILS NFR BLD AUTO: 66.4 %
NONHDLC SERPL-MCNC: 140 MG/DL (ref ?–130)
OSMOLALITY SERPL CALC.SUM OF ELEC: 292 MOSM/KG (ref 275–295)
PLATELET # BLD AUTO: 380 10(3)UL (ref 150–450)
POTASSIUM SERPL-SCNC: 4.1 MMOL/L (ref 3.5–5.1)
RBC # BLD AUTO: 5.32 X10(6)UL
SODIUM SERPL-SCNC: 141 MMOL/L (ref 136–145)
T4 FREE SERPL-MCNC: 1.3 NG/DL (ref 0.8–1.7)
TRIGL SERPL-MCNC: 51 MG/DL (ref 30–149)
TSI SER-ACNC: 26.31 UIU/ML (ref 0.55–4.78)
VLDLC SERPL CALC-MCNC: 9 MG/DL (ref 0–30)
WBC # BLD AUTO: 6.3 X10(3) UL (ref 4–11)

## 2025-01-21 PROCEDURE — 99395 PREV VISIT EST AGE 18-39: CPT | Performed by: INTERNAL MEDICINE

## 2025-01-21 PROCEDURE — 84450 TRANSFERASE (AST) (SGOT): CPT

## 2025-01-21 PROCEDURE — 85025 COMPLETE CBC W/AUTO DIFF WBC: CPT

## 2025-01-21 PROCEDURE — 83036 HEMOGLOBIN GLYCOSYLATED A1C: CPT

## 2025-01-21 PROCEDURE — 84439 ASSAY OF FREE THYROXINE: CPT

## 2025-01-21 PROCEDURE — 80061 LIPID PANEL: CPT

## 2025-01-21 PROCEDURE — 84443 ASSAY THYROID STIM HORMONE: CPT

## 2025-01-21 PROCEDURE — 80048 BASIC METABOLIC PNL TOTAL CA: CPT

## 2025-01-21 PROCEDURE — 84460 ALANINE AMINO (ALT) (SGPT): CPT

## 2025-01-21 PROCEDURE — 36415 COLL VENOUS BLD VENIPUNCTURE: CPT

## 2025-01-21 RX ORDER — LEVOTHYROXINE SODIUM 150 UG/1
150 TABLET ORAL
Qty: 90 TABLET | Refills: 0 | Status: SHIPPED | OUTPATIENT
Start: 2025-01-21 | End: 2025-01-22

## 2025-01-21 NOTE — PATIENT INSTRUCTIONS
Continue to exercise at least 150 minutes a week and Eat a plant based diet     Please take 2000 IU of vitamin D daily for life to keep your bones strong    Please see your dentist every 6 months    Continue with regular eye exams    Blood work has been ordered    I have referred you to the surgeon    Let me know if your anxiety symptoms return    See me yearly for routine checkup

## 2025-01-21 NOTE — TELEPHONE ENCOUNTER
Protocol FAIL    Requesting: LEVOTHYROXINE 150 MCG Oral Tab     LOV: TODAY 1/21/25  RTC: YEARLY  Filled: 10/24/24 90 TABLET 0 REFILL  Recent Labs: TODAY 1/21/25    Upcoming OV   No future appointments.

## 2025-01-21 NOTE — PROGRESS NOTES
Patient Office Visit    ASSESSMENT AND PLAN:   1. Routine physical examination  Note: Continue to exercise at least 150 minutes a week and Eat a plant based diet. Please take 2000 IU of vitamin D daily for life to keep your bones strong. Please see your dentist every 6 months.  Continue with regular eye exams  - ALT(SGPT); Future  - AST (SGOT); Future  - Basic Metabolic Panel (8); Future  - Lipid Panel; Future  - CBC With Differential With Platelet; Future  - Hemoglobin A1C; Future  - TSH W Reflex To Free T4; Future    2. Hypothyroidism, unspecified type  Note: Continue levothyroxine  - TSH W Reflex To Free T4; Future    3. Umbilical hernia without obstruction and without gangrene  - Surgery Referral - In Network    4. Family history of prostate cancer  Note: Patient's grandfather and father both had prostate cancer.  Patient is not having any symptoms but he would like to have the test done.  - PSA, Total (Screening) [E]; Future    5. Anxiety  Note: Discussed to monitor his symptoms more.  If it returns then we can do further testing but it seems like it could have been anxiety related.  Blood work has been ordered as above    6. Overweight (BMI 25.0-29.9)  Note: Has been working on diet and lifestyle modifications    Return to clinic yearly for routine check up    Patient/Caregiver Education: Patient/Caregiver Education: There are no barriers to learning. Medical education done. Outcome: Patient verbalizes understanding. Patient is notified to call with any questions, complications, allergies, or worsening or changing symptoms.  Patient is to call with any side effects or complications from the treatments as a result of today.      Reviewed Past Medical History and   Patient Active Problem List   Diagnosis    Hypothyroidism    Uses hearing aid       Orders Placed This Encounter   Procedures    ALT(SGPT)     Standing Status:   Future     Number of Occurrences:   1     Standing Expiration Date:   1/21/2026    AST  (SGOT)     Standing Status:   Future     Number of Occurrences:   1     Standing Expiration Date:   1/21/2026    Basic Metabolic Panel (8)     Standing Status:   Future     Number of Occurrences:   1     Standing Expiration Date:   1/21/2026    Lipid Panel     Standing Status:   Future     Number of Occurrences:   1     Standing Expiration Date:   1/21/2026    CBC With Differential With Platelet     Standing Status:   Future     Number of Occurrences:   1     Standing Expiration Date:   1/21/2026    Hemoglobin A1C     Standing Status:   Future     Number of Occurrences:   1     Standing Expiration Date:   1/21/2026    TSH W Reflex To Free T4     Standing Status:   Future     Number of Occurrences:   1     Standing Expiration Date:   1/21/2026    PSA, Total (Screening) [E]     Standing Status:   Future     Number of Occurrences:   1     Standing Expiration Date:   1/21/2026     Order Specific Question:   Release to patient     Answer:   Immediate     Requested Prescriptions      No prescriptions requested or ordered in this encounter         Humera Bennett DO  CC:  Chief Complaint   Patient presents with    Physical         HPI:   Jamal Fritz is a 39-year-old male who presents for routine physical    Anxiety: During the Christmas holidays which was about a month ago he was working quite a bit and felt like he had an anxiety attack.  He felt like his whole body was getting stiff and his bowel movements were changing.  His anxiety is slowly getting better and now he feels it has resolved but his symptoms just got better about a week ago.  His bowel movements have been getting better as well after changing his diet.  His stiffness in the body improved after he had a massage done.  He is not having any other symptoms.  In regards to his weight he has been losing weight intentionally  Hypothyroid: Stable on  Umbilical hernia: He has been wearing a bandage on his abdomen which has helped with the bulging.  He is  interested in getting a procedure done this year    Past Medical History:    Hypothyroidism       Past Surgical History:   Procedure Laterality Date    Other surgical history      wisdom tooth extraction    Tonsillectomy         Social History:  Social History     Socioeconomic History    Marital status:    Tobacco Use    Smoking status: Never    Smokeless tobacco: Never   Vaping Use    Vaping status: Never Used   Substance and Sexual Activity    Alcohol use: Yes     Alcohol/week: 0.0 - 1.0 standard drinks of alcohol     Comment: occasionally    Drug use: No   Other Topics Concern    Caffeine Concern Yes    Exercise Yes    Seat Belt Yes    Special Diet No    Stress Concern Yes     Comment: I had an anxiety attack at work     Social Drivers of Health     Food Insecurity: No Food Insecurity (1/21/2025)    NCSS - Food Insecurity     Worried About Running Out of Food in the Last Year: No     Ran Out of Food in the Last Year: No   Transportation Needs: No Transportation Needs (1/21/2025)    NCSS - Transportation     Lack of Transportation: No   Housing Stability: Not At Risk (1/21/2025)    NCSS - Housing/Utilities     Has Housing: Yes     Worried About Losing Housing: No     Unable to Get Utilities: No     Family History:  Family History   Problem Relation Age of Onset    Cancer Father 50        prostate CA    Other (Other) Mother         hypothyroidism    Cancer Maternal Grandmother         breast CA    Cancer Paternal Grandfather 50        prostate CA    Other (Other) Sister         hypothyroidism, thyroid CA    No Known Problems Maternal Grandfather     No Known Problems Paternal Grandmother     Diabetes Neg     Heart Disorder Neg     Stroke Neg      Allergies:  Allergies[1]  Current Meds:  Medications Ordered Prior to Encounter[2]      REVIEW OF SYSTEMS   Constitutional: no fatigue normal energy no weight changes   HENT: normal sinuses and no mouth issues   Eyes: . normal vision no eye pain   Respiratory:  normal respirations no cough   Cardiovascular: no CP, or palpitations   Gastrointestinal: normal bowels and no abd pains   Genitourinary:  normal urination no hematuria, no frequency   Musculoskeletal: no pains in arms/legs, normal range of motion   Skin: no rashes or skin lesions that are new   Neurological:  no weakness, no numbness, normal gait   Hematological:  no bruises or bleeding   Psychiatric/Behavioral: normal mood no anxiety normal behavior     /74 (BP Location: Right arm, Patient Position: Sitting, Cuff Size: adult)   Pulse 74   Temp 98.2 °F (36.8 °C) (Temporal)   Resp 18   Ht 5' 6.34\" (1.685 m)   Wt 176 lb 12.8 oz (80.2 kg)   SpO2 98%   BMI 28.24 kg/m²     PHYSICAL EXAM:   Constitutional: Vital signs reviewed as noted, well developed, in no acute distress.   HENT: NCAT, cerumen noted in the ears bilaterally with hearing aids in place  Eyes: pupils reactive bilaterally  Neck: No thyroidmegaly  Cardiovascular: nl s1 s2 no m/r/g  Pulmonary/Chest: CTA bilaterally with no wheezes  Abdominal: Soft NT normal Bowel sounds, small umbilical hernia noted that is reducible  Musculoskeletal:  normal ROM in UE and LE  Extremities: no pedal edema   Neurological:  no weakness in UE and LE, reflexes are normal  Skin: no rashes or bruises on visualized skin, not undressed   Psychiatric:normal mood              [1] No Known Allergies  [2]   Current Outpatient Medications on File Prior to Visit   Medication Sig Dispense Refill    LEVOTHYROXINE 150 MCG Oral Tab TAKE 1 TABLET BY MOUTH BEFORE BREAKFAST 90 tablet 0    Multiple Vitamins-Minerals (MULTI-VITAMIN/MINERALS) Oral Tab Take 1 tablet by mouth daily.       No current facility-administered medications on file prior to visit.

## 2025-01-22 DIAGNOSIS — E03.9 HYPOTHYROIDISM, UNSPECIFIED TYPE: Primary | ICD-10-CM

## 2025-01-22 RX ORDER — LEVOTHYROXINE SODIUM 175 UG/1
175 TABLET ORAL
Qty: 90 TABLET | Refills: 1 | Status: SHIPPED | OUTPATIENT
Start: 2025-01-22

## 2025-02-19 ENCOUNTER — OFFICE VISIT (OUTPATIENT)
Dept: SURGERY | Facility: CLINIC | Age: 40
End: 2025-02-19
Payer: COMMERCIAL

## 2025-02-19 VITALS
TEMPERATURE: 98 F | DIASTOLIC BLOOD PRESSURE: 70 MMHG | OXYGEN SATURATION: 97 % | HEART RATE: 71 BPM | SYSTOLIC BLOOD PRESSURE: 115 MMHG | RESPIRATION RATE: 20 BRPM

## 2025-02-19 DIAGNOSIS — K42.9 UMBILICAL HERNIA WITHOUT OBSTRUCTION AND WITHOUT GANGRENE: Primary | ICD-10-CM

## 2025-02-19 PROCEDURE — 99213 OFFICE O/P EST LOW 20 MIN: CPT | Performed by: SURGERY

## 2025-02-19 NOTE — H&P
New Patient  Jamal Fritz  8/16/1985 umbilical hernia    2/19/2025    This patient was referred by Humera Bennett DO for evaluation and consultation for umbilical hernia.    Chief Complaint: Bulge by bellybutton    History of Present Illness: Patient is a 39-year-old male who has noticed a periumbilical bulge for a few years.He also has LLQ discomfort that occurs when doing lower extremity workouts, this has increased in symptomatology over last 8-10 months based on spouse's recollection. 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 Uses abdominal binder for comfort.  He states it has been stable. He was recently seen by his PCP and referred for management of the umbilical hernia.  Patient is wishes to have it repaired as this is symptomatic.    Review of Systems:  Constitutional: No fevers or chills or recent weight loss, no recent sick contacts  Respiratory: Denies any shortness of breath, productive cough, dyspnea  Cardiovascular: No chest pain  Gastrointestinal: No nausea, vomiting, diarrhea, abdominal pain  Urologic: Denies any dysuria    Past Medical History:    Hypothyroidism       Past Surgical History:   Procedure Laterality Date    Other surgical history      wisdom tooth extraction    Tonsillectomy         Social History     Socioeconomic History    Marital status:      Spouse name: Not on file    Number of children: Not on file    Years of education: Not on file    Highest education level: Not on file   Occupational History    Not on file   Tobacco Use    Smoking status: Never    Smokeless tobacco: Never   Vaping Use    Vaping status: Never Used   Substance and Sexual Activity    Alcohol use: Yes     Alcohol/week: 0.0 - 1.0 standard drinks of alcohol     Comment: occasionally    Drug use: No    Sexual activity: Not on file   Other Topics Concern    Caffeine Concern Yes    Exercise Yes    Seat Belt Yes    Special Diet No    Stress Concern Yes     Comment: I had an  anxiety attack at work    Weight Concern Not Asked   Social History Narrative    Not on file     Social Drivers of Health     Food Insecurity: No Food Insecurity (1/21/2025)    NCSS - Food Insecurity     Worried About Running Out of Food in the Last Year: No     Ran Out of Food in the Last Year: No   Transportation Needs: No Transportation Needs (1/21/2025)    NCSS - Transportation     Lack of Transportation: No   Stress: Not on file   Housing Stability: Not At Risk (1/21/2025)    NCSS - Housing/Utilities     Has Housing: Yes     Worried About Losing Housing: No     Unable to Get Utilities: No         Current Outpatient Medications:     levothyroxine 175 MCG Oral Tab, Take 1 tablet (175 mcg total) by mouth before breakfast., Disp: 90 tablet, Rfl: 1    Multiple Vitamins-Minerals (MULTI-VITAMIN/MINERALS) Oral Tab, Take 1 tablet by mouth daily., Disp: , Rfl:     Allergies[1]    Family History   Problem Relation Age of Onset    Cancer Father 50        prostate CA    Other (Other) Mother         hypothyroidism    Cancer Maternal Grandmother         breast CA    Cancer Paternal Grandfather 50        prostate CA    Other (Other) Sister         hypothyroidism, thyroid CA    No Known Problems Maternal Grandfather     No Known Problems Paternal Grandmother     Diabetes Neg     Heart Disorder Neg     Stroke Neg        Objective/Physical Exam:  General: Alert, orientated x3.  Cooperative.  No apparent distress.  Vital Signs:  Blood pressure 115/70, pulse 71, temperature 98.1 °F (36.7 °C), temperature source Temporal, resp. rate 20, SpO2 97%. There is no height or weight on file to calculate BMI.  HEENT: NC/AT   Lungs: Even, unlabored  Cardiac: Regular rate and rhythm. No murmur.  Abdomen: Soft, ND/NT, no R/G, umbilical bulge, reducable  Extremities:  SMAE  Skin: Warm & dry      Labs/Radiology:  None    Discussed:   The potential surgical and non-surgical treatment options were discussed with the patient.  The potential risks,  benefits, complications outcomes/recovery and alternatives to any proposed surgery were also fully reviewed with the patient. Any proposed surgical procedure was described in detail.  Expected postoperative pain, recuperation and postoperative course was discussed.  The patient verbalizes understanding.  All questions from the patient were discussed in detail to the patient's satisfaction.  No other questions were presented at this time.         Assessment:      Pt with symptomatic reducable umbilical hernia    Plan:  Plan for  elective repair    Thank you for allowing me to participate in your patient's care.         Zhen Krueger MD    Please note that this report has been produced using speech recognition software and may contain errors related to that system including but not limited to errors in grammar, punctuation and spelling as well as words and phrases that possibly may have been recognized inappropriately.  If there are any questions or concerns please contact the dictating provider for clarification.        Date of Surgery:   TBD  DX:     ICD-10-CM   1. Umbilical hernia without obstruction and without gangrene  K42.9        Surgery Site :  Bilateral inguinal reducible - reducible umbilical hernia    [] Open  [x] Laparoscopic  [x] Robotic    [] Pilonidal Cystectomy  [] Excision of Lipomas     [] Sigmoidectomies    [] Hemorrhoidectomy   [] Transanal Hemorrhoidal Dearterialization [] Rectal Exam Under Anesthesia  [] Ureteral stent, Urologist  [x] Hernia   [] Ventral  [x] Umbilical, <3cm   [x] Inguinal  [] Incisional  [] Paraesophageal [] Component Separation (TAR)   [] Cholecystectomy  [] Appendectomy    [] Port placement      Anesthesia: ANESTHESIA TYPE: Gen    Location:  [] [facility]   [] [facility]  [] [facility]  [x] [facility]   [] [facility]    Admission Status: EDW OR ADMIT LOCATION: [facility]    SA Needed: Yes/No: No      Clearance Needed:  []Medical Clearance   []Cardiac Clearance:    []Anticoagulation Management:    []Renal:   []Neuro:     Hx sleep apnea:    Yes/No: No      Pacemaker:  Yes/No: No      Latex allergies: No evidence of latex-specific IgE     Pre-Admission Testing:  Surgeon's Personalized order Set.   Prophylactic Antibiotics Order: Prophylactic antibiotic protocol    Equipment:   [] C-Arm  Other:                 [1] No Known Allergies

## 2025-02-21 ENCOUNTER — TELEPHONE (OUTPATIENT)
Facility: LOCATION | Age: 40
End: 2025-02-21

## 2025-02-21 DIAGNOSIS — K42.9 UMBILICAL HERNIA WITHOUT OBSTRUCTION OR GANGRENE: Primary | ICD-10-CM

## 2025-03-12 ENCOUNTER — TELEPHONE (OUTPATIENT)
Facility: LOCATION | Age: 40
End: 2025-03-12

## 2025-03-19 ENCOUNTER — LAB ENCOUNTER (OUTPATIENT)
Dept: LAB | Age: 40
End: 2025-03-19
Attending: INTERNAL MEDICINE
Payer: COMMERCIAL

## 2025-03-19 DIAGNOSIS — E03.9 HYPOTHYROIDISM, UNSPECIFIED TYPE: ICD-10-CM

## 2025-03-19 LAB
T3FREE SERPL-MCNC: 3.31 PG/ML (ref 2.4–4.2)
T4 FREE SERPL-MCNC: 1.7 NG/DL (ref 0.8–1.7)
TSI SER-ACNC: 0.45 UIU/ML (ref 0.55–4.78)

## 2025-03-19 PROCEDURE — 36415 COLL VENOUS BLD VENIPUNCTURE: CPT

## 2025-03-19 PROCEDURE — 84443 ASSAY THYROID STIM HORMONE: CPT

## 2025-03-19 PROCEDURE — 84439 ASSAY OF FREE THYROXINE: CPT

## 2025-03-19 PROCEDURE — 84481 FREE ASSAY (FT-3): CPT

## 2025-03-19 NOTE — TELEPHONE ENCOUNTER
Disability forms received via email. Logged for processing. [employer] message sent for Release of Information completion

## 2025-03-20 DIAGNOSIS — E03.9 HYPOTHYROIDISM, UNSPECIFIED TYPE: ICD-10-CM

## 2025-03-20 RX ORDER — LEVOTHYROXINE SODIUM 175 UG/1
TABLET ORAL
COMMUNITY
Start: 2025-03-20

## 2025-03-21 NOTE — TELEPHONE ENCOUNTER
Cecy     Please sign off on form if you agree to: disability due to surgery     -Signature page will be the first page scanned  -From your Inbasket, Highlight the patient and click Chart   -Double click the 3/12/25 Forms Completion telephone encounter  -Scroll down to the Media section   -Click the blue Hyperlink: disability Dr. Krueger 3/21/25  -Click Acknowledge located in the top right ribbon/menu   -Drag the mouse into the blank space of the document and a + sign will appear. Left click to   electronically sign the document.  -Once signed, simply exit out of the screen and you signature will be saved.     Thank you,    Lucinda

## 2025-03-21 NOTE — TELEPHONE ENCOUNTER
Patient called back got details will let rep know.     Type of Leave:  Continuous fmla  Reason for Leave: Surgery Hernia   Start date of leave: 4/8/2025  End date of leave: 5/6/2025   How many flare ups per month/length?:  How many appts per month/length?:   Was Fee and Turnaround info Given?:

## 2025-03-21 NOTE — TELEPHONE ENCOUNTER
Reached out to providers office to obtain hand signature, provider is not on onbase yet. Awaiting response.

## 2025-03-24 ENCOUNTER — TELEPHONE (OUTPATIENT)
Facility: LOCATION | Age: 40
End: 2025-03-24

## 2025-03-24 NOTE — TELEPHONE ENCOUNTER
SUSAN DICKEY Patient  Member ID  [id number]    Date of Birth  1985-08-16    Gender  Male    Eligibility Status  Active Coverage    Group Number  [id number]    Plan / Coverage Date  2025-01-06    Transaction Type  Outpatient Authorization    Organization  [facility]    Payer  AETNA (COMMERCIAL & MEDICARE)    [employer]etna logo  Transaction ID: Not FoundCustomer ID: [id number] Date: NA  No Authorization Required  Place of Service  22 - On [facility]    Service From - To Date  NA    Admission Type  9    Diagnosis Code 1  K429 - Umbilical hernia without obstruction or gangrene    Procedure Code 1  57181    Quantity  1 Units    Procedure From - To Date  2025-04-08    Status  NO AUTH REQUIRED    Message  PRECERT IS NOT REQUIRED OR ONLY REQUIRED IN UNIQUE CIRCUMSTANCES FOR MEDICAID REFER TO PRIOR AUTH TOOL ON [employer] FOR ALL OTHERS REFER TO CODE SEARCH TOOL ON [email or web address] COVERAGE OF SERVICES ARE SUBJECT TO BENEFITS AND ELIGIBILITY    Procedure Code 2  39488    Quantity  1 Units    Procedure From - To Date  2025-04-08    Status  NO AUTH REQUIRED    Message  PRECERT IS NOT REQUIRED OR ONLY REQUIRED IN UNIQUE CIRCUMSTANCES FOR MEDICAID REFER TO PRIOR AUTH TOOL ON [employer] FOR ALL OTHERS REFER TO CODE SEARCH TOOL ON [email or web address] COVERAGE OF SERVICES ARE SUBJECT TO BENEFITS AND ELIGIBILITY

## 2025-04-07 ENCOUNTER — ANESTHESIA EVENT (OUTPATIENT)
Dept: SURGERY | Facility: HOSPITAL | Age: 40
End: 2025-04-07
Payer: COMMERCIAL

## 2025-04-08 ENCOUNTER — ANESTHESIA (OUTPATIENT)
Dept: SURGERY | Facility: HOSPITAL | Age: 40
End: 2025-04-08
Payer: COMMERCIAL

## 2025-04-08 ENCOUNTER — HOSPITAL ENCOUNTER (OUTPATIENT)
Facility: HOSPITAL | Age: 40
Setting detail: HOSPITAL OUTPATIENT SURGERY
Discharge: HOME | End: 2025-04-08
Attending: SURGERY | Admitting: SURGERY
Payer: COMMERCIAL

## 2025-04-08 VITALS
WEIGHT: 179.88 LBS | TEMPERATURE: 97 F | BODY MASS INDEX: 28.23 KG/M2 | HEIGHT: 67 IN | SYSTOLIC BLOOD PRESSURE: 129 MMHG | HEART RATE: 74 BPM | DIASTOLIC BLOOD PRESSURE: 83 MMHG | RESPIRATION RATE: 21 BRPM | OXYGEN SATURATION: 98 %

## 2025-04-08 DIAGNOSIS — K40.20 BILATERAL INGUINAL HERNIA WITHOUT OBSTRUCTION OR GANGRENE, RECURRENCE NOT SPECIFIED: Primary | ICD-10-CM

## 2025-04-08 PROBLEM — K42.0 UMBILICAL HERNIA WITH OBSTRUCTION BUT NO GANGRENE: Status: ACTIVE | Noted: 2025-04-08

## 2025-04-08 PROBLEM — K40.00 INCARCERATED INGUINAL HERNIA, BILATERAL: Status: ACTIVE | Noted: 2025-04-08

## 2025-04-08 PROCEDURE — 0YUA4JZ SUPPLEMENT BILATERAL INGUINAL REGION WITH SYNTHETIC SUBSTITUTE, PERCUTANEOUS ENDOSCOPIC APPROACH: ICD-10-PCS | Performed by: SURGERY

## 2025-04-08 PROCEDURE — 49507 PRP I/HERN INIT BLOCK >5 YR: CPT | Performed by: SURGERY

## 2025-04-08 PROCEDURE — 0WQF4ZZ REPAIR ABDOMINAL WALL, PERCUTANEOUS ENDOSCOPIC APPROACH: ICD-10-PCS | Performed by: SURGERY

## 2025-04-08 PROCEDURE — 49591 RPR AA HRN 1ST < 3 CM RDC: CPT | Performed by: SURGERY

## 2025-04-08 PROCEDURE — 49507 PRP I/HERN INIT BLOCK >5 YR: CPT

## 2025-04-08 PROCEDURE — 8E0W4CZ ROBOTIC ASSISTED PROCEDURE OF TRUNK REGION, PERCUTANEOUS ENDOSCOPIC APPROACH: ICD-10-PCS | Performed by: SURGERY

## 2025-04-08 PROCEDURE — 49591 RPR AA HRN 1ST < 3 CM RDC: CPT

## 2025-04-08 DEVICE — 3DMAX MID ANATOMICAL MESH, 12 CM X 17 CM (5" X 7"), EXTRA LARGE, LEFT
Type: IMPLANTABLE DEVICE | Site: INGUINAL | Status: FUNCTIONAL
Brand: 3DMAX

## 2025-04-08 DEVICE — 3DMAX MID ANATOMICAL MESH, 12 CM X 17 CM (5" X 7"), EXTRA LARGE, RIGHT
Type: IMPLANTABLE DEVICE | Site: INGUINAL | Status: FUNCTIONAL
Brand: 3DMAX

## 2025-04-08 DEVICE — VISTASEAL FIBRIN SEAL 4 ML: Type: IMPLANTABLE DEVICE | Site: INGUINAL | Status: FUNCTIONAL

## 2025-04-08 RX ORDER — ALBUTEROL SULFATE 0.83 MG/ML
2.5 SOLUTION RESPIRATORY (INHALATION) AS NEEDED
Status: DISCONTINUED | OUTPATIENT
Start: 2025-04-08 | End: 2025-04-08

## 2025-04-08 RX ORDER — ACETAMINOPHEN 500 MG
1000 TABLET ORAL ONCE AS NEEDED
Status: COMPLETED | OUTPATIENT
Start: 2025-04-08 | End: 2025-04-08

## 2025-04-08 RX ORDER — DEXAMETHASONE SODIUM PHOSPHATE 4 MG/ML
VIAL (ML) INJECTION AS NEEDED
Status: DISCONTINUED | OUTPATIENT
Start: 2025-04-08 | End: 2025-04-08 | Stop reason: SURG

## 2025-04-08 RX ORDER — HEPARIN SODIUM 5000 [USP'U]/ML
INJECTION, SOLUTION INTRAVENOUS; SUBCUTANEOUS
Status: DISCONTINUED
Start: 2025-04-08 | End: 2025-04-08

## 2025-04-08 RX ORDER — GLYCOPYRROLATE 0.2 MG/ML
INJECTION, SOLUTION INTRAMUSCULAR; INTRAVENOUS AS NEEDED
Status: DISCONTINUED | OUTPATIENT
Start: 2025-04-08 | End: 2025-04-08 | Stop reason: SURG

## 2025-04-08 RX ORDER — HYDROMORPHONE HYDROCHLORIDE 1 MG/ML
0.2 INJECTION, SOLUTION INTRAMUSCULAR; INTRAVENOUS; SUBCUTANEOUS EVERY 5 MIN PRN
Status: DISCONTINUED | OUTPATIENT
Start: 2025-04-08 | End: 2025-04-08

## 2025-04-08 RX ORDER — LIDOCAINE HYDROCHLORIDE 10 MG/ML
INJECTION, SOLUTION EPIDURAL; INFILTRATION; INTRACAUDAL; PERINEURAL AS NEEDED
Status: DISCONTINUED | OUTPATIENT
Start: 2025-04-08 | End: 2025-04-08 | Stop reason: SURG

## 2025-04-08 RX ORDER — HYDROMORPHONE HYDROCHLORIDE 1 MG/ML
0.6 INJECTION, SOLUTION INTRAMUSCULAR; INTRAVENOUS; SUBCUTANEOUS EVERY 5 MIN PRN
Status: DISCONTINUED | OUTPATIENT
Start: 2025-04-08 | End: 2025-04-08

## 2025-04-08 RX ORDER — MIDAZOLAM HYDROCHLORIDE 1 MG/ML
1 INJECTION INTRAMUSCULAR; INTRAVENOUS EVERY 5 MIN PRN
Status: DISCONTINUED | OUTPATIENT
Start: 2025-04-08 | End: 2025-04-08

## 2025-04-08 RX ORDER — HYDROCODONE BITARTRATE AND ACETAMINOPHEN 10; 325 MG/1; MG/1
2 TABLET ORAL ONCE AS NEEDED
Status: COMPLETED | OUTPATIENT
Start: 2025-04-08 | End: 2025-04-08

## 2025-04-08 RX ORDER — ONDANSETRON 2 MG/ML
INJECTION INTRAMUSCULAR; INTRAVENOUS
Status: COMPLETED
Start: 2025-04-08 | End: 2025-04-08

## 2025-04-08 RX ORDER — LABETALOL HYDROCHLORIDE 5 MG/ML
5 INJECTION, SOLUTION INTRAVENOUS EVERY 5 MIN PRN
Status: DISCONTINUED | OUTPATIENT
Start: 2025-04-08 | End: 2025-04-08

## 2025-04-08 RX ORDER — SODIUM CHLORIDE, SODIUM LACTATE, POTASSIUM CHLORIDE, CALCIUM CHLORIDE 600; 310; 30; 20 MG/100ML; MG/100ML; MG/100ML; MG/100ML
INJECTION, SOLUTION INTRAVENOUS CONTINUOUS
Status: DISCONTINUED | OUTPATIENT
Start: 2025-04-08 | End: 2025-04-08

## 2025-04-08 RX ORDER — KETOROLAC TROMETHAMINE 30 MG/ML
INJECTION, SOLUTION INTRAMUSCULAR; INTRAVENOUS AS NEEDED
Status: DISCONTINUED | OUTPATIENT
Start: 2025-04-08 | End: 2025-04-08 | Stop reason: SURG

## 2025-04-08 RX ORDER — ROCURONIUM BROMIDE 10 MG/ML
INJECTION, SOLUTION INTRAVENOUS AS NEEDED
Status: DISCONTINUED | OUTPATIENT
Start: 2025-04-08 | End: 2025-04-08 | Stop reason: SURG

## 2025-04-08 RX ORDER — ONDANSETRON 2 MG/ML
INJECTION INTRAMUSCULAR; INTRAVENOUS AS NEEDED
Status: DISCONTINUED | OUTPATIENT
Start: 2025-04-08 | End: 2025-04-08 | Stop reason: SURG

## 2025-04-08 RX ORDER — HYDROCODONE BITARTRATE AND ACETAMINOPHEN 10; 325 MG/1; MG/1
1 TABLET ORAL ONCE AS NEEDED
Status: COMPLETED | OUTPATIENT
Start: 2025-04-08 | End: 2025-04-08

## 2025-04-08 RX ORDER — ONDANSETRON 2 MG/ML
4 INJECTION INTRAMUSCULAR; INTRAVENOUS EVERY 6 HOURS PRN
Status: DISCONTINUED | OUTPATIENT
Start: 2025-04-08 | End: 2025-04-08

## 2025-04-08 RX ORDER — ACETAMINOPHEN 500 MG
1000 TABLET ORAL ONCE
Status: DISCONTINUED | OUTPATIENT
Start: 2025-04-08 | End: 2025-04-08 | Stop reason: HOSPADM

## 2025-04-08 RX ORDER — MEPERIDINE HYDROCHLORIDE 25 MG/ML
12.5 INJECTION INTRAMUSCULAR; INTRAVENOUS; SUBCUTANEOUS AS NEEDED
Status: DISCONTINUED | OUTPATIENT
Start: 2025-04-08 | End: 2025-04-08

## 2025-04-08 RX ORDER — HYDROMORPHONE HYDROCHLORIDE 1 MG/ML
0.4 INJECTION, SOLUTION INTRAMUSCULAR; INTRAVENOUS; SUBCUTANEOUS EVERY 5 MIN PRN
Status: DISCONTINUED | OUTPATIENT
Start: 2025-04-08 | End: 2025-04-08

## 2025-04-08 RX ORDER — HEPARIN SODIUM 5000 [USP'U]/ML
5000 INJECTION, SOLUTION INTRAVENOUS; SUBCUTANEOUS ONCE
Status: COMPLETED | OUTPATIENT
Start: 2025-04-08 | End: 2025-04-08

## 2025-04-08 RX ORDER — MIDAZOLAM HYDROCHLORIDE 1 MG/ML
INJECTION INTRAMUSCULAR; INTRAVENOUS AS NEEDED
Status: DISCONTINUED | OUTPATIENT
Start: 2025-04-08 | End: 2025-04-08 | Stop reason: SURG

## 2025-04-08 RX ORDER — BUPIVACAINE HYDROCHLORIDE 2.5 MG/ML
INJECTION, SOLUTION EPIDURAL; INFILTRATION; INTRACAUDAL; PERINEURAL AS NEEDED
Status: DISCONTINUED | OUTPATIENT
Start: 2025-04-08 | End: 2025-04-08 | Stop reason: HOSPADM

## 2025-04-08 RX ORDER — PROCHLORPERAZINE EDISYLATE 5 MG/ML
5 INJECTION INTRAMUSCULAR; INTRAVENOUS EVERY 8 HOURS PRN
Status: DISCONTINUED | OUTPATIENT
Start: 2025-04-08 | End: 2025-04-08

## 2025-04-08 RX ORDER — DIPHENHYDRAMINE HYDROCHLORIDE 50 MG/ML
12.5 INJECTION, SOLUTION INTRAMUSCULAR; INTRAVENOUS AS NEEDED
Status: DISCONTINUED | OUTPATIENT
Start: 2025-04-08 | End: 2025-04-08

## 2025-04-08 RX ORDER — OXYCODONE HYDROCHLORIDE 5 MG/1
5 TABLET ORAL EVERY 4 HOURS PRN
Qty: 15 TABLET | Refills: 0 | Status: SHIPPED | OUTPATIENT
Start: 2025-04-08

## 2025-04-08 RX ORDER — SCOPOLAMINE 1 MG/3D
1 PATCH, EXTENDED RELEASE TRANSDERMAL ONCE
Status: DISCONTINUED | OUTPATIENT
Start: 2025-04-08 | End: 2025-04-08 | Stop reason: HOSPADM

## 2025-04-08 RX ORDER — NEOSTIGMINE METHYLSULFATE 1 MG/ML
INJECTION INTRAVENOUS AS NEEDED
Status: DISCONTINUED | OUTPATIENT
Start: 2025-04-08 | End: 2025-04-08 | Stop reason: SURG

## 2025-04-08 RX ORDER — NALOXONE HYDROCHLORIDE 0.4 MG/ML
80 INJECTION, SOLUTION INTRAMUSCULAR; INTRAVENOUS; SUBCUTANEOUS AS NEEDED
Status: DISCONTINUED | OUTPATIENT
Start: 2025-04-08 | End: 2025-04-08

## 2025-04-08 RX ADMIN — LIDOCAINE HYDROCHLORIDE 50 MG: 10 INJECTION, SOLUTION EPIDURAL; INFILTRATION; INTRACAUDAL; PERINEURAL at 07:39:00

## 2025-04-08 RX ADMIN — ROCURONIUM BROMIDE 50 MG: 10 INJECTION, SOLUTION INTRAVENOUS at 07:39:00

## 2025-04-08 RX ADMIN — SODIUM CHLORIDE, SODIUM LACTATE, POTASSIUM CHLORIDE, CALCIUM CHLORIDE: 600; 310; 30; 20 INJECTION, SOLUTION INTRAVENOUS at 10:11:00

## 2025-04-08 RX ADMIN — MIDAZOLAM HYDROCHLORIDE 2 MG: 1 INJECTION INTRAMUSCULAR; INTRAVENOUS at 07:35:00

## 2025-04-08 RX ADMIN — KETOROLAC TROMETHAMINE 30 MG: 30 INJECTION, SOLUTION INTRAMUSCULAR; INTRAVENOUS at 10:04:00

## 2025-04-08 RX ADMIN — SODIUM CHLORIDE, SODIUM LACTATE, POTASSIUM CHLORIDE, CALCIUM CHLORIDE: 600; 310; 30; 20 INJECTION, SOLUTION INTRAVENOUS at 08:53:00

## 2025-04-08 RX ADMIN — ROCURONIUM BROMIDE 10 MG: 10 INJECTION, SOLUTION INTRAVENOUS at 09:38:00

## 2025-04-08 RX ADMIN — GLYCOPYRROLATE 0.4 MG: 0.2 INJECTION, SOLUTION INTRAMUSCULAR; INTRAVENOUS at 10:07:00

## 2025-04-08 RX ADMIN — DEXAMETHASONE SODIUM PHOSPHATE 8 MG: 4 MG/ML VIAL (ML) INJECTION at 07:48:00

## 2025-04-08 RX ADMIN — SODIUM CHLORIDE, SODIUM LACTATE, POTASSIUM CHLORIDE, CALCIUM CHLORIDE: 600; 310; 30; 20 INJECTION, SOLUTION INTRAVENOUS at 07:33:00

## 2025-04-08 RX ADMIN — NEOSTIGMINE METHYLSULFATE 3 MG: 1 INJECTION INTRAVENOUS at 10:07:00

## 2025-04-08 RX ADMIN — ROCURONIUM BROMIDE 10 MG: 10 INJECTION, SOLUTION INTRAVENOUS at 08:29:00

## 2025-04-08 RX ADMIN — ONDANSETRON 4 MG: 2 INJECTION INTRAMUSCULAR; INTRAVENOUS at 10:07:00

## 2025-04-08 NOTE — ANESTHESIA PROCEDURE NOTES
Airway  Date/Time: 4/8/2025 7:42 AM  Urgency: elective      General Information and Staff    Patient location during procedure: OR  Anesthesiologist: Pk Denson MD  Performed: anesthesiologist   Performed by: Pk Denson MD  Authorized by: Pk Denson MD      Indications and Patient Condition  Indications for airway management: anesthesia  Sedation level: deep  Preoxygenated: yes  Patient position: sniffing  Mask difficulty assessment: 2 - vent by mask + OA or adjuvant +/- NMBA    Final Airway Details  Final airway type: endotracheal airway      Successful airway: ETT  Cuffed: yes   Successful intubation technique: Video laryngoscopy  Endotracheal tube insertion site: oral  Blade: GlideScope  Blade size: #3  ETT size (mm): 7.5    Cormack-Lehane Classification: grade I - full view of glottis  Placement verified by: capnometry   Measured from: lips  ETT to lips (cm): 20  Number of attempts at approach: 1  Ventilation between attempts: BVM  Number of other approaches attempted: 1    Other Attempts  Unsuccessful attempted endotracheal techniques: direct laryngoscopy    Additional Comments  DL x1, Grade III view, changed to glide scope with BVM in between. Vital sign stable throughout.

## 2025-04-08 NOTE — OPERATIVE REPORT
[facility]   part of [facility]    Operative Note         Jamal Fritz Location: OR   CSN [id number] MRN [id number]   Admission Date 4/8/2025 Operation Date 4/8/2025   Attending Physician Zhen Krueger MD       Patient Name: Jamal Fritz     Preoperative Diagnosis: Umbilical hernia, bilateral inguinal hernia    Postoperative Diagnosis: Same    Procedure(s):  ROBOTIC BILATERAL INGUINAL HERNIA REPAIR AND UMBILICAL HERNIA REPAIR     Primary Surgeon: Zhen Krueger MD     PA: Samantha Tesfaye PA-C     Anesthesia: General     Specimen: * No specimens in log *     Estimated Blood Loss: Blood Output: 20 mL (4/8/2025  9:59 AM)  Complications: none      Indications for procedure: Symptomatic umbilical and inguinal hernias     Surgical Findings:   R: Indirect: 2cm Direct: -  Femoral: -  L: Indirect: 2cm Direct: -  Femoral: -  Umbilical hernia: 1cm M3 zone defect    Operative Summary:    The patient was brought to the operating room, and, after the induction of general endotracheal anesthesia, the abdomen was prepped and draped in the usual sterile fashion. A robotic port was planned in the upper midline through the umbilical defect. Optical entry was performed using a 5mm 0 degree laparoscope & optical trocar in the LUQ. The abdomen was insufflated under direct visualization. Additional ports were placed in the standard position, cephalad & to the RIGHT. A supraumbilical incision was made & dissection was carried to the level of the fascia. The herniated preperitoneal fat was grasped with a hemostat & dissected free from the fascia, this was then amputated using electrocautery, a 1cm defect was noted.   On peritoneoscopy, no significant adhesions were noted.  The patient was positioned in Trendelenburg position.   A peritoneal incision was made & a flap was propagated across the midline from the right to the left. Dissection was continued to the MPO bilaterally, the flap was continued across midline, exposing the  pubic symphasis. A RIGHT 2cm indirect hernia, the herniated cord lipoma was reduced using traction & electrocautery. Similar dissection was completd on the LEFT side, demonstrating a 2cm indirect hernia with cord lipoma.   The vas deferens was parietalized & the spermatic cord was dissected free from the flap. A XL Bard 3D Max MID mesh was introduced bilaterally and oriented in a standing position, with reinspection of the degree of flap dissection. The inferior mesh border was fixated using Vistaseal fibrin sealant and the mesh was anchored to the conjoint tendon and laterally using 2-0 Vicryl suture & to the midline through both mesh. The flap was raised & secured using 2-0 barbed suture.  The umbilical port was removed & the hernia was closed using 0 Prolene suture in a figure of eight fashion. Ports were removed under direct visualization and noted to be hemostatic.  Skin incisions were reapproximated using subcuticular 4-0 Monocryl suture.  Steri-Strips were used as dressing.  The umbilicus was packed with gauze & a suction dressing was applied.   The patient was awakened from anesthesia and brought to the recovery room in stable condition, having tolerated the procedure without apparent complication.  Operative findings were discussed with the patient's family immediately upon conclusion of the procedure.         Implants:   Implant Name Type Inv. Item Serial No.  Lot No. LRB No. Used Action   MESH DEMETRICE XL [id number] R INGUINAL MFIL POLYPR - [id number]  MESH DEMETRICE XL [id number] R INGUINAL MFIL POLYPR [id number] [employer]  [id number] Right 1 Implanted   MESH DEMETRICE XL 5X7IN L MFIL POLYPR BARON - [id number]  MESH DEMETRICE XL 5X7IN L MFIL POLYPR BARON [id number] [employer]  [id number] Left 1 Implanted   VISTASEAL FIBRIN SEAL 4 ML - [id number]  VISTASEAL FIBRIN SEAL 4 ML [id number] [employer]  [id number] Bilateral 1 Implanted   VISTASEAL FIBRIN SEAL 4 ML - [id number]  VISTASEAL FIBRIN SEAL  4  [id number] [employer] WD [id number] Bilateral 1 Implanted        Drains: None     Condition: Stable       Zhen Krueger MD

## 2025-04-08 NOTE — DISCHARGE INSTRUCTIONS
Home Care Instructions  Laparoscopic Inguinal Hernia  Dr Zhen Krueger    MEDICATIONS  Take 2 Extra Strength Tylenol (1000mg every) 8 hours for pain. For the first 3 days it is best to take the Tylenol every 8 hours even if you do not feel much pain.   You can also take NSAID (Advil or Alleve):  Advil (ibuprofen) 800mg every 8 hours or Alleve (naproxen) 500mg every 12 hours for pain. For the first 3 days it is best to take the NSAID around the clock even if you do not feel much pain.   For pain that is not controlled with the above, take half to one Oxycodone pill (2.5-5mg) every six hours as needed for pain. If you do not feel that narcotics are necessary you shouldn’t take them.   Some residual discomfort is not uncommon, the goal is not absence of discomfort but treating pain such that you can resume your activities.   Please ask your surgeon before resuming blood thinners such as Aspirin, Plavix, Coumadin, Warfarin, Eliquis, or Xarelto. All other home medications may be resumed as scheduled.     DIET  The patient may resume a general diet immediately.  This is not a good time to eat excessively. The patient should eat in moderation and stick with foods the patient feels are easy to digest.  There should be no alcohol consumption in the immediate recover time period or within six hours of taking narcotics.    WOUND CARE  Do not remove the steri-strips or butterfly tapes that are white and adherent to the skin.  The steri-strips will eventually peel up at the ends and at this point they may be removed.  This is usually seven to ten days after surgery.  The patient may shower the day after surgery.  There is no need to cover the incisions and all top gauze type dressing should be removed prior to showering.  Soap can get on the wounds but do not scrub over the wounds.  No hair dye or chemicals of any kind should get in the wounds.  Avoid tub baths for two weeks.  If visible sutures or staples are present they will be  removed in the office by the surgeon or nurse.  Most wounds will be closed with dissolving suture underneath the skin.  These sutures will dissolve on their own.    ACTIVITY  Every day the patient should be up, showered and dressed.  Each day the patient should be up and around the house.  The patient should not lie in bed and should not stay in pajamas.  We count on the patient being up, coughing, walking and deep breathing to avoid pneumonia and blood clots in the legs.  Once a day the patient should get out of the house and go shopping, go to the mall, the [employer] store, the [employer] or a restaurant.  The patient may ride in a car but should not drive the car for at least one week.  Patients should be off narcotics for at least 8 hours prior to being the .  The average time off work is 10 to 14 days; most adults will be seeing the surgeon prior to returning to work.  Patients may go up and down stairs and lift up to five pounds but no bending, pushing or pulling.  Nothing called work or exercise until the follow up visit.  No ‘stair-master’ power walking, jogging or workout until the follow up visit.  Patients should seek further activity limits at the time of their appointment.  If the patient is unable to urinate and feels a painful and full bladder call us immediately.    APPOINTMENT  Please call our office today for an appointment within five to ten days of discharge.  Any fever greater than 100.5, chills, nausea, vomiting, or severe diarrhea, please call our office.  If the wound turns red, hot, swollen, becomes increasingly painful, or drains pus call us immediately at [phone].  For life threatening emergencies call 911.  For non-emergent care please call our office after 8:30 a.m. Monday through Friday.  The number listed above is our office number, and our phone automatically switches to our answering service if we are not there.  Please do not use the emergency room for nonurgent  care.    Thank you for entrusting us with your care.  [facility]--General Surgery     You received a drug called Toradol which is an Anti Inflammatory at: 10:00AM  If you are allowed to take Anti inflammatories:    Do not take any Anti Inflammatory like Motrin, Aleve or Ibuprophen until after: 4:00PM  Please report any suspected allergic reactions or bleeding issues to your doctor

## 2025-04-08 NOTE — ANESTHESIA POSTPROCEDURE EVALUATION
[facility]    Jamal Fritz Patient Status:  Hospital Outpatient Surgery   Age/Gender 39 year old male MRN [id number]   Location [facility] POST ANESTHESIA CARE UNIT Attending Zhen Krueger MD   Hosp Day # 0 PCP Humera Bennett DO       Anesthesia Post-op Note    ROBOTIC BILATERAL INGUINAL HERNIA REPAIR AND UMBILICAL HERNIA REPAIR    Procedure Summary       Date: 04/08/25 Room / Location:  MAIN OR 09 / [facility] MAIN OR    Anesthesia Start: 0733 Anesthesia Stop:     Procedure: ROBOTIC BILATERAL INGUINAL HERNIA REPAIR AND UMBILICAL HERNIA REPAIR (Bilateral: Abdomen) Diagnosis:       Umbilical hernia without obstruction or gangrene      (Umbilical hernia without obstruction or gangrene [K42.9])    Surgeons: Zhen Krueger MD Anesthesiologist: Pk Denson MD    Anesthesia Type: general ASA Status: 2            Anesthesia Type: general    Vitals Value Taken Time   /69 04/08/25 1013   Temp 97.0 degrees F 04/08/25 1013   Pulse 66 04/08/25 1013   Resp 17 04/08/25 1013   SpO2 97 % 04/08/25 1013   Vitals shown include unfiled device data.        Patient Location: PACU    Anesthesia Type: general    Airway Patency: patent and extubated    Postop Pain Control: adequate    Mental Status: mildly sedated but able to meaningfully participate in the post-anesthesia evaluation    Nausea/Vomiting: none    Cardiopulmonary/Hydration status: stable euvolemic    Complications: no apparent anesthesia related complications    Postop vital signs: stable    Dental Exam: Unchanged from Preop    Patient to be discharged from PACU when criteria met.

## 2025-04-08 NOTE — ANESTHESIA PREPROCEDURE EVALUATION
PRE-OP EVALUATION    Patient Name: Jamal Fritz    Admit Diagnosis: Umbilical hernia without obstruction or gangrene [K42.9]    Pre-op Diagnosis: Umbilical hernia without obstruction or gangrene [K42.9]    ROBOTIC BILATERAL INGUINAL HERNIA REPAIR AND UMBILICAL HERNIA REPAIR    Anesthesia Procedure: ROBOTIC BILATERAL INGUINAL HERNIA REPAIR AND UMBILICAL HERNIA REPAIR (Bilateral: Abdomen)    Surgeons and Role:     * Zhen Krueger MD - Primary    Pre-op vitals reviewed.  Temp: 98.2 °F (36.8 °C)  Pulse: 69  Resp: 18  BP: 117/80  SpO2: 100 %  Body mass index is 28.18 kg/m².    Current medications reviewed.  Hospital Medications:   [Transfer Hold] acetaminophen (Tylenol Extra Strength) tab 1,000 mg  1,000 mg Oral Once    [Transfer Hold] scopolamine (Transderm-Scop) 1 MG/3DAYS patch 1 patch  1 patch Transdermal Once    lactated ringers infusion   Intravenous Continuous    [COMPLETED] heparin (Porcine) 5000 UNIT/ML injection 5,000 Units  5,000 Units Subcutaneous Once    [COMPLETED] ceFAZolin (Ancef) 2g in 10mL IV syringe premix  2 g Intravenous Once    heparin (Porcine) 5000 UNIT/ML injection           Outpatient Medications:   Prescriptions Prior to Admission[1]    Allergies: Patient has no known allergies.      Anesthesia Evaluation    Patient summary reviewed.    Anesthetic Complications  (-) history of anesthetic complications         GI/Hepatic/Renal    Negative GI/hepatic/renal ROS.                             Cardiovascular    Negative cardiovascular ROS.    Exercise tolerance: good     MET: >4                             (-) angina              Endo/Other           (+) hypothyroidism                       Pulmonary    Negative pulmonary ROS.           (-) shortness of breath            Neuro/Psych    Negative neuro/psych ROS.                                  Past Surgical History:   Procedure Laterality Date    Other surgical history      wisdom tooth extraction    Tonsillectomy       Social History      Socioeconomic History    Marital status:    Tobacco Use    Smoking status: Never    Smokeless tobacco: Never   Vaping Use    Vaping status: Never Used   Substance and Sexual Activity    Alcohol use: Yes     Alcohol/week: 0.0 - 1.0 standard drinks of alcohol     Comment: occasionally    Drug use: No   Other Topics Concern    Caffeine Concern Yes    Exercise Yes    Seat Belt Yes    Special Diet No    Stress Concern Yes     Comment: I had an anxiety attack at work     History   Drug Use No     Available pre-op labs reviewed.  Lab Results   Component Value Date    WBC 6.3 01/21/2025    RBC 5.32 01/21/2025    HGB 16.4 01/21/2025    HCT 47.0 01/21/2025    MCV 88.3 01/21/2025    MCH 30.8 01/21/2025    MCHC 34.9 01/21/2025    RDW 12.0 01/21/2025    .0 01/21/2025     Lab Results   Component Value Date     01/21/2025    K 4.1 01/21/2025     01/21/2025    CO2 28.0 01/21/2025    BUN 15 01/21/2025    CREATSERUM 1.12 01/21/2025    GLU 86 01/21/2025    CA 10.5 01/21/2025            Airway      Mallampati: III  Mouth opening: 3 FB  TM distance: < 4 cm  Neck ROM: full Cardiovascular      Rhythm: regular  Rate: normal     Dental             Pulmonary      Breath sounds clear to auscultation bilaterally.               Other findings              ASA: 2   Plan: general  NPO status verified and patient meets guidelines.    Post-procedure pain management plan discussed with surgeon and patient.      Plan/risks discussed with: patient and spouse                Present on Admission:  **None**             [1]   Medications Prior to Admission   Medication Sig Dispense Refill Last Dose/Taking    levothyroxine 175 MCG Oral Tab Take one tablet Monday through Saturday. Take half a tablet on sundays   4/8/2025 at  3:30 AM    Multiple Vitamins-Minerals (MULTI-VITAMIN/MINERALS) Oral Tab Take 1 tablet by mouth daily.   More than a month

## 2025-04-08 NOTE — H&P
History and Physical     Jamal Fritz Patient Status:  Hospital Outpatient Surgery    1985 MRN [id number]   Location [facility] PERIOPERATIVE SERVICE Attending Zhen Krueger MD   Hosp Day # 0 PCP Humera Bennett DO     Chief Complaint: Umbilical bulge    Subjective:    Jamal Fritz is a 39 year old male with umbilical and bilateral inguinal hernias    History/Other:      Past Medical History:  Past Medical History:    Disorder of thyroid    Hearing impaired person, bilateral    bilateral hearing aids    Hypothyroidism    Visual impairment    glasses        Past Surgical History:   Past Surgical History:   Procedure Laterality Date    Other surgical history      wisdom tooth extraction    Tonsillectomy         Social History:  reports that he has never smoked. He has never used smokeless tobacco. He reports current alcohol use. He reports that he does not use drugs.    Family History:   Family History   Problem Relation Age of Onset    Cancer Father 50        prostate CA    Other (Other) Mother         hypothyroidism    Cancer Maternal Grandmother         breast CA    Cancer Paternal Grandfather 50        prostate CA    Other (Other) Sister         hypothyroidism, thyroid CA    No Known Problems Maternal Grandfather     No Known Problems Paternal Grandmother     Diabetes Neg     Heart Disorder Neg     Stroke Neg        Allergies: Allergies[1]    Medications:  Medications Ordered Prior to Encounter[2]    Review of Systems:   A comprehensive 14 point review of systems was completed.    Pertinent positives and negatives noted in the HPI.    Objective:    General: Alert, orientated x3.  Cooperative.  No apparent distress.  Vital Signs:  Blood pressure 117/80, pulse 69, temperature 98.2 °F (36.8 °C), temperature source Temporal, resp. rate 18, height 67\", weight 179 lb 14.3 oz (81.6 kg), SpO2 100%. Body mass index is 28.18 kg/m².  HEENT: NC/AT   Lungs: Even, unlabored  Cardiac: Regular rate and  rhythm. No murmur.  Abdomen: Soft, ND/NT, no R/G, umbilical bulge  Extremities:  SMAE  Skin: Warm & dry      Results:    Labs:    Selected labs - last 24 hours:  Endo  Lytes  Renal   Glu -  Na - Ca -  BUN -   POC Gluc  -  K - PO4 -  Cr -   A1c -  Cl - Mg -  eGFR -   TSH -  CO2 -         LFT  CBC  Other   AST -  WBC -  PTT - Procal -   ALT -  Hb -  INR - CRP -   APk -  Hct -  Trop - D dim -   T mary -  PLT -  pBNP -  BNP -  - Ferritin  -   Prot -    CK  - Lactate  -   Alb -    LDL  - COVID  -       Imaging: Imaging data reviewed in Epic.    Assessment & Plan:    #Pt w/ umbilical & bilateral inguinal hernias, plan for robotic repair of inguinal & repair of umbilical hernia    Quality:  DVT Mechanical Prophylaxis:        DVT Pharmacologic Prophylaxis   Medication    heparin (Porcine) 5000 UNIT/ML injection                Code Status: Not on file  Treadwell: No urinary catheter in place  Treadwell Duration (in days):   Central line:    DU:     Plan of care discussed with pt & wife    Zhen Krueger MD  4/8/2025    Supplementary Documentation:                                      [1] No Known Allergies  [2]   No current facility-administered medications on file prior to encounter.     Current Outpatient Medications on File Prior to Encounter   Medication Sig Dispense Refill    Multiple Vitamins-Minerals (MULTI-VITAMIN/MINERALS) Oral Tab Take 1 tablet by mouth daily.

## 2025-04-09 ENCOUNTER — TELEPHONE (OUTPATIENT)
Facility: LOCATION | Age: 40
End: 2025-04-09

## 2025-04-11 ENCOUNTER — TELEPHONE (OUTPATIENT)
Facility: LOCATION | Age: 40
End: 2025-04-11

## 2025-04-11 NOTE — TELEPHONE ENCOUNTER
Received Family Medical Leave Act forms in the forms department via e-mail with invalid Release of Information sent my chart message for Release of Information authorization. Logged for processing.

## 2025-04-11 NOTE — TELEPHONE ENCOUNTER
Sal called regarding the patient's FMLA and had some questions regarding the patient's previous surgery. He would like to know when will the patient be able to return to work and what type of procedure did the patient receive?    Call back # [phone]  Claim # [id number]

## 2025-04-14 DIAGNOSIS — E03.9 HYPOTHYROIDISM, UNSPECIFIED TYPE: ICD-10-CM

## 2025-04-14 NOTE — TELEPHONE ENCOUNTER
Protocol FAIL    Requesting: levothyroxine 175 MCG Oral Tab     LOV: 1/21/25  RTC: YEARLY   Filled: 3/20/25  Recent Labs: 3/19/25    Upcoming OV   Future Appointments   Date Time Provider Department Center   4/18/2025 10:30 AM [id number]

## 2025-04-15 RX ORDER — LEVOTHYROXINE SODIUM 175 UG/1
TABLET ORAL
Qty: 90 TABLET | Refills: 0 | Status: SHIPPED | OUTPATIENT
Start: 2025-04-15

## 2025-04-18 ENCOUNTER — OFFICE VISIT (OUTPATIENT)
Facility: LOCATION | Age: 40
End: 2025-04-18
Payer: COMMERCIAL

## 2025-04-18 VITALS
TEMPERATURE: 99 F | DIASTOLIC BLOOD PRESSURE: 83 MMHG | OXYGEN SATURATION: 98 % | HEART RATE: 72 BPM | SYSTOLIC BLOOD PRESSURE: 129 MMHG

## 2025-04-18 DIAGNOSIS — Z98.890 POST-OPERATIVE STATE: Primary | ICD-10-CM

## 2025-04-18 PROBLEM — K42.0 UMBILICAL HERNIA WITH OBSTRUCTION BUT NO GANGRENE: Status: RESOLVED | Noted: 2025-04-08 | Resolved: 2025-04-18

## 2025-04-18 PROBLEM — K40.00 INCARCERATED INGUINAL HERNIA, BILATERAL: Status: RESOLVED | Noted: 2025-04-08 | Resolved: 2025-04-18

## 2025-04-18 PROCEDURE — 99024 POSTOP FOLLOW-UP VISIT: CPT | Performed by: PHYSICIAN ASSISTANT

## 2025-04-18 NOTE — PROGRESS NOTES
Post Operative Visit Note       Active Problems  1. Post-operative state         Chief Complaint   Chief Complaint   Patient presents with    Post-Op     PO - ROBOTIC BILATERAL INGUINAL HERNIA REPAIR AND UMBILICAL HERNIA REPAIR W/ VVA 4/8. Pt is overall doing well. Pt reports some slight discomfort while walking to both sides, a bit worse on the L side, but denies any pain. Pt denies any other symptoms.           History of Present Illness   The patient presents today for postoperative visit following a robotic bilateral inguinal hernia repair and umbilical hernia repair on 4/8/25 by Dr. Barahona. He reports feeling very well post surgery. He reports slight discomfort on the left side of his groin while walking. He tried ibuprofen for the pain during the first week post surgery. He reports moderate itchiness at the incision sites. He states he had hard stool for which he tried stool softeners that helped. He denies nausea and vomiting. He denies fever and chills. He is tolerating his diet well.    Allergies  Jamal has no known allergies.    Past Medical / Surgical / Social / Family History    The past medical and past surgical history have been reviewed by me today.     Past Medical History:    Disorder of thyroid    Hearing impaired person, bilateral    bilateral hearing aids    Hypothyroidism    Visual impairment    glasses     Past Surgical History:   Procedure Laterality Date    Other surgical history      wisdom tooth extraction    Tonsillectomy         The family history and social history have been reviewed by me today.    Family History   Problem Relation Age of Onset    Cancer Father 50        prostate CA    Prostate Cancer Father     Other (Other) Mother         hypothyroidism    Cancer Maternal Grandmother         breast CA    Cancer Paternal Grandfather 50        prostate CA    Prostate Cancer Paternal Grandfather     Other (Other) Sister         hypothyroidism, thyroid CA    No Known Problems Maternal  Grandfather     No Known Problems Paternal Grandmother     Diabetes Neg     Heart Disorder Neg     Stroke Neg      Social History     Socioeconomic History    Marital status:    Tobacco Use    Smoking status: Never    Smokeless tobacco: Never   Vaping Use    Vaping status: Never Used   Substance and Sexual Activity    Alcohol use: Yes     Alcohol/week: 1.0 standard drink of alcohol     Types: 1 Standard drinks or equivalent per week     Comment: occasionally    Drug use: No   Other Topics Concern    Caffeine Concern No    Exercise No    Seat Belt No    Special Diet No    Stress Concern No    Weight Concern No        Current Outpatient Medications:     levothyroxine 175 MCG Oral Tab, Take one tablet Monday through Saturday. Take half a tablet on sundays, Disp: 90 tablet, Rfl: 0    Multiple Vitamins-Minerals (MULTI-VITAMIN/MINERALS) Oral Tab, Take 1 tablet by mouth in the morning., Disp: , Rfl:     oxyCODONE 5 MG Oral Tab, Take 1 tablet (5 mg total) by mouth every 4 (four) hours as needed for Pain. (Patient not taking: Reported on 4/18/2025), Disp: 15 tablet, Rfl: 0      Review of Systems  A 10 point Review of Systems has been completed by me today and is negative except as above in the HPI.    Physical Findings   /83 (BP Location: Left arm, Patient Position: Sitting, Cuff Size: adult)   Pulse 72   Temp 98.8 °F (37.1 °C) (Temporal)   SpO2 98%   Gen/psych: alert and oriented, cooperative, no apparent distress  Cardiovascular: regular rate  Respiratory: respirations unlabored, no wheeze  Abdominal: soft, non-tender, non-distended, no guarding/rebound  Incisions: Clean, dry, in-tact. No erythema or swelling.         Assessment/Plan  1. Post-operative state        Robotic bilateral inguinal hernia repair and umbilical hernia repair  Incisional sites healing well.  Can try Miralax if constipation continues.  Patient educated on lifting restriction of no more than 20 Ib 6 weeks post surgery.       Honey Betancourt  JESI     No orders of the defined types were placed in this encounter.       Imaging & Referrals   None    Follow Up  Return if symptoms worsen or fail to improve.    The patient is overall doing well since surgery.  He denies nausea or vomiting.  He is tolerating diet well.  He does report mild constipation.  Abdomen is soft, nondistended, nontender to palpation.  Incisions are healing well.    Plan:  Patient is to continue with diet as tolerated.  The patient is to continue lifting restrictions of no more than 20 pounds for 6 weeks from the date of his surgery.  The patient is to return to work on 5/20/2025 without restrictions.  I instructed the patient that he may notify the office if he is able to return to work earlier with lifting restrictions and we will send a new letter to his MyChart.  All questions or concerns were answered.  He is return to clinic as needed.    Josie Harris PA-C  [facility] General Surgery  4/18/2025  10:51 AM

## 2025-04-18 NOTE — TELEPHONE ENCOUNTER
Dr. Krueger,    Please sign off on form if you agree to: Additional disability forms. Start date 04/08/25-05/06/25 Return to work 05/07/25    -Signature page will be the first page scanned  -From your Inbasket, Highlight the patient and click Chart   -Double click the 04/09/2025 Forms Completion telephone encounter  -Scroll down to the Media section   -Click the blue Hyperlink: disability Dr Krueger 04/18/25  -Click Acknowledge located in the top right ribbon/menu   -Drag the mouse into the blank space of the document and a + sign will appear. Left click to   electronically sign the document.  -Once signed, simply exit out of the screen and you signature will be saved.     Thank you,  Nida

## 2025-04-21 NOTE — TELEPHONE ENCOUNTER
Disability completed and uploaded to patient's [employer], no authorization for [employer] and previous Release of Information questionnaire, per patient method of release [employer].

## 2025-05-02 ENCOUNTER — TELEPHONE (OUTPATIENT)
Facility: LOCATION | Age: 40
End: 2025-05-02

## 2025-05-13 ENCOUNTER — TELEPHONE (OUTPATIENT)
Facility: LOCATION | Age: 40
End: 2025-05-13

## 2025-05-30 ENCOUNTER — PATIENT MESSAGE (OUTPATIENT)
Dept: INTERNAL MEDICINE CLINIC | Facility: CLINIC | Age: 40
End: 2025-05-30

## 2025-06-02 NOTE — TELEPHONE ENCOUNTER
Spoke to patient who reports a noticeable groove in his stool that resolved a couple days ago. Patient denies any other symptoms.     Writer advised to call back if symptoms return. Patient v/u

## 2025-06-04 ENCOUNTER — LAB ENCOUNTER (OUTPATIENT)
Dept: LAB | Age: 40
End: 2025-06-04
Attending: INTERNAL MEDICINE
Payer: COMMERCIAL

## 2025-06-04 DIAGNOSIS — E03.9 HYPOTHYROIDISM, UNSPECIFIED TYPE: ICD-10-CM

## 2025-06-04 LAB — TSI SER-ACNC: 1.51 UIU/ML (ref 0.55–4.78)

## 2025-06-04 PROCEDURE — 36415 COLL VENOUS BLD VENIPUNCTURE: CPT

## 2025-06-04 PROCEDURE — 84443 ASSAY THYROID STIM HORMONE: CPT

## 2025-06-05 ENCOUNTER — PATIENT MESSAGE (OUTPATIENT)
Dept: INTERNAL MEDICINE CLINIC | Facility: CLINIC | Age: 40
End: 2025-06-05

## 2025-07-27 DIAGNOSIS — E03.9 HYPOTHYROIDISM, UNSPECIFIED TYPE: ICD-10-CM

## 2025-07-28 RX ORDER — LEVOTHYROXINE SODIUM 175 UG/1
TABLET ORAL
Qty: 90 TABLET | Refills: 0 | Status: SHIPPED | OUTPATIENT
Start: 2025-07-28

## 2025-07-28 NOTE — TELEPHONE ENCOUNTER
levothyroxine 175 MCG Oral Tab         Sig: Take one tablet Monday through Saturday. Take half a tablet on sundays    Disp: 90 tablet    Refills: 0    Start: 7/27/2025    Class: Normal    Non-formulary For: Hypothyroidism, unspecified type    To pharmacy: Please cancel the 150 mcg dose    Last ordered: 3 months ago (4/15/2025) by Humera Bennett DO    Thyroid Medication Protocol [id number]/2025 06:50 PM   Protocol Details TSH in past 12 months    Last TSH value is normal    In person appointment or virtual visit in the past 12 mos or appointment in next 3 mos    Medication is active on med list      To be filled at: [id number] - [place], [place] - [address] [phone], [phone]           LOV:  1/21/25  RTC:  year  Recent Labs:       Component  Ref Range & Units (hover) 6/4/25 11:19 AM   TSH 1.507        Upcoming OV  none

## (undated) DEVICE — FORCE BIPOLAR: Brand: ENDOWRIST

## (undated) DEVICE — Device: Brand: SUTURE PASSOR PRO

## (undated) DEVICE — ARM DRAPE

## (undated) DEVICE — BINDER ABD UNISX 9IN 62IN L AND XL UNIV

## (undated) DEVICE — COVER,LIGHT,CAMERA,HARD,1/PK,STRL: Brand: MEDLINE

## (undated) DEVICE — STERILE DRAPE FOR USE WITH SITUATE ROOM SCANNER: Brand: SITUATE

## (undated) DEVICE — BLADELESS OBTURATOR: Brand: WECK VISTA

## (undated) DEVICE — COLUMN DRAPE

## (undated) DEVICE — LAPAROVUE VISIBILITY SYSTEM LAPAROSCOPIC SOLUTIONS: Brand: LAPAROVUE

## (undated) DEVICE — GLOVE SUR 6.5 SENSICARE PI PIP CRM PWD F

## (undated) DEVICE — STANDARD HYPODERMIC NEEDLE,POLYPROPYLENE HUB: Brand: MONOJECT

## (undated) DEVICE — DRAPE,TOP,102X53,STERILE: Brand: MEDLINE

## (undated) DEVICE — GLOVE SUR 7 SENSICARE PI PIP CRM PWD F

## (undated) DEVICE — SEAL

## (undated) DEVICE — LAPAROSCOPIC DUAL RIGID APPLICATOR: Brand: ETHICON

## (undated) DEVICE — SYRINGE MED 30ML STD CLR PLAS LL TIP N CTRL

## (undated) DEVICE — SUT STRATAFIX 2-0 9IN SH ABSRB VLT 26MM 1/2 C

## (undated) DEVICE — ROBOTIC GENERAL: Brand: MEDLINE INDUSTRIES, INC.

## (undated) DEVICE — SUT PROL 0 30IN CT-2 NABSRB BLU L26MM 1/2 CIR

## (undated) DEVICE — ZZ--CONVERTED-TO-500976-PENCIL SMK EVAC L10FT MPLR BLDE JAW OPN

## (undated) DEVICE — SUT MCRYL 4-0 18IN PS-2 ABSRB UD 19MM 3/8 CIR

## (undated) DEVICE — TIP COVER ACCESSORY

## (undated) DEVICE — MONOPOLAR CURVED SCISSORS: Brand: ENDOWRIST

## (undated) DEVICE — MEGA NEEDLE DRIVER: Brand: ENDOWRIST

## (undated) DEVICE — GLOVE SUR 7.5 SENSICARE PI PIP GRN PWD F

## (undated) DEVICE — 3M™ IOBAN™ 2 ANTIMICROBIAL INCISE DRAPE 6648EZ: Brand: IOBAN™ 2

## (undated) DEVICE — 40580 - THE PINK PAD - ADVANCED TRENDELENBURG POSITIONING KIT: Brand: 40580 - THE PINK PAD - ADVANCED TRENDELENBURG POSITIONING KIT

## (undated) DEVICE — VIOLET BRAIDED (POLYGLACTIN 910), SYNTHETIC ABSORBABLE SUTURE: Brand: COATED VICRYL

## (undated) NOTE — LETTER
2025    Return to Work    Name: Jamal Fritz        : 1985    To Whom It May Concern,    Jamal Fritz had surgery on 2025 and is:    Able to return to school / work without restrictions on 2025.      If there are any further questions, regarding this patient's care, please contact the patient directly.    Sincerely,    Josie Harris PA-C

## (undated) NOTE — LETTER
22    Patient: Deb Blackmon  : 1985 Visit date: 2022    Dear  REBECCA Jack    Thank you for referring Deb Blackmon to my practice. Please find my assessment and plan below. History of Present Illness   Sherlyn Acevedo is a 43-year-old male referred to clinic from immediate care of [place] for evaluation of ruptured sebaceous cyst.  The patient is here today with his wife. The patient reports he had a cyst to his right shoulder for many years that has had intermittent drainage. Last week this area became red and extremely painful. This pain increased and he presented to the immediate care for further evaluation. The ER physician performed an bedside I&D and placed packing. He was discharged with clindamycin and told to follow-up with general surgery for further evaluation. The patient reports improved pain since the I&D. He is concerned there is a large cyst sac still in place that we will cause the cyst to recur. The patient has past medical history of hypothyroidism. The patient has no past surgical abdominal history. The patient has no family history of cystic diseases. The patient denies tobacco, alcohol, or drug abuse. The patient denies use of blood thinning medications. Assessment   Infected sebaceous cyst of skin  (primary encounter diagnosis)      Plan   The patient will continue clindamycin 3 times daily for 10 days.   He will return to clinic next week for reevaluation of the cyst.  If the cyst recurs, will perform local excision of the sebaceous cyst.         Sincerely,       Brandon Blackman MD   CC: No Recipients

## (undated) NOTE — LETTER
07/01/20        Jorge Wilson  [id number]      Dear Gricelda Magaña records indicate that you have outstanding lab work and or testing that was ordered for you and has not yet been completed:  Orders Placed This Encounter      Basic Me

## (undated) NOTE — LETTER
04/04/18        Meet Gonzalez  [id number]      Dear Solo Gomez records indicate that you have outstanding lab work and or testing that was ordered for you and has not yet been completed:          Lipid Panel [E]      Basic Metabol